# Patient Record
Sex: FEMALE | Race: BLACK OR AFRICAN AMERICAN | NOT HISPANIC OR LATINO | Employment: FULL TIME | URBAN - METROPOLITAN AREA
[De-identification: names, ages, dates, MRNs, and addresses within clinical notes are randomized per-mention and may not be internally consistent; named-entity substitution may affect disease eponyms.]

---

## 2021-11-08 ENCOUNTER — APPOINTMENT (OUTPATIENT)
Dept: MRI IMAGING | Facility: CLINIC | Age: 56
End: 2021-11-08
Attending: EMERGENCY MEDICINE
Payer: COMMERCIAL

## 2021-11-08 ENCOUNTER — HOSPITAL ENCOUNTER (EMERGENCY)
Facility: CLINIC | Age: 56
Discharge: HOME | End: 2021-11-08
Attending: EMERGENCY MEDICINE | Admitting: EMERGENCY MEDICINE
Payer: COMMERCIAL

## 2021-11-08 VITALS
SYSTOLIC BLOOD PRESSURE: 143 MMHG | TEMPERATURE: 97.8 F | RESPIRATION RATE: 18 BRPM | OXYGEN SATURATION: 100 % | HEART RATE: 58 BPM | WEIGHT: 149.2 LBS | DIASTOLIC BLOOD PRESSURE: 62 MMHG

## 2021-11-08 DIAGNOSIS — R42 VERTIGO: ICD-10-CM

## 2021-11-08 LAB
ALBUMIN SERPL-MCNC: 3.6 G/DL (ref 3.4–5)
ALBUMIN UR-MCNC: NEGATIVE MG/DL
ALP SERPL-CCNC: 96 U/L (ref 40–150)
ALT SERPL W P-5'-P-CCNC: 24 U/L (ref 0–50)
AMORPH CRY #/AREA URNS HPF: ABNORMAL /HPF
ANION GAP SERPL CALCULATED.3IONS-SCNC: 5 MMOL/L (ref 3–14)
APPEARANCE UR: ABNORMAL
AST SERPL W P-5'-P-CCNC: 13 U/L (ref 0–45)
ATRIAL RATE - MUSE: 59 BPM
BASOPHILS # BLD AUTO: 0 10E3/UL (ref 0–0.2)
BASOPHILS NFR BLD AUTO: 1 %
BILIRUB SERPL-MCNC: 0.3 MG/DL (ref 0.2–1.3)
BILIRUB UR QL STRIP: NEGATIVE
BUN SERPL-MCNC: 14 MG/DL (ref 7–30)
CALCIUM SERPL-MCNC: 8.9 MG/DL (ref 8.5–10.1)
CHLORIDE BLD-SCNC: 106 MMOL/L (ref 94–109)
CO2 SERPL-SCNC: 27 MMOL/L (ref 20–32)
COLOR UR AUTO: ABNORMAL
CREAT SERPL-MCNC: 0.48 MG/DL (ref 0.52–1.04)
DIASTOLIC BLOOD PRESSURE - MUSE: NORMAL MMHG
EOSINOPHIL # BLD AUTO: 0 10E3/UL (ref 0–0.7)
EOSINOPHIL NFR BLD AUTO: 0 %
ERYTHROCYTE [DISTWIDTH] IN BLOOD BY AUTOMATED COUNT: 13.4 % (ref 10–15)
GFR SERPL CREATININE-BSD FRML MDRD: >90 ML/MIN/1.73M2
GLUCOSE BLD-MCNC: 170 MG/DL (ref 70–99)
GLUCOSE UR STRIP-MCNC: NEGATIVE MG/DL
HCT VFR BLD AUTO: 38.5 % (ref 35–47)
HGB BLD-MCNC: 12.7 G/DL (ref 11.7–15.7)
HGB UR QL STRIP: NEGATIVE
HOLD SPECIMEN: NORMAL
IMM GRANULOCYTES # BLD: 0 10E3/UL
IMM GRANULOCYTES NFR BLD: 0 %
INTERPRETATION ECG - MUSE: NORMAL
KETONES UR STRIP-MCNC: NEGATIVE MG/DL
LEUKOCYTE ESTERASE UR QL STRIP: NEGATIVE
LYMPHOCYTES # BLD AUTO: 1 10E3/UL (ref 0.8–5.3)
LYMPHOCYTES NFR BLD AUTO: 13 %
MAGNESIUM SERPL-MCNC: 2 MG/DL (ref 1.6–2.3)
MCH RBC QN AUTO: 28.2 PG (ref 26.5–33)
MCHC RBC AUTO-ENTMCNC: 33 G/DL (ref 31.5–36.5)
MCV RBC AUTO: 86 FL (ref 78–100)
MONOCYTES # BLD AUTO: 0.3 10E3/UL (ref 0–1.3)
MONOCYTES NFR BLD AUTO: 4 %
MUCOUS THREADS #/AREA URNS LPF: PRESENT /LPF
NEUTROPHILS # BLD AUTO: 6.2 10E3/UL (ref 1.6–8.3)
NEUTROPHILS NFR BLD AUTO: 82 %
NITRATE UR QL: NEGATIVE
NRBC # BLD AUTO: 0 10E3/UL
NRBC BLD AUTO-RTO: 0 /100
P AXIS - MUSE: 37 DEGREES
PH UR STRIP: 7.5 [PH] (ref 5–7)
PLATELET # BLD AUTO: 265 10E3/UL (ref 150–450)
POTASSIUM BLD-SCNC: 4.2 MMOL/L (ref 3.4–5.3)
PR INTERVAL - MUSE: 170 MS
PROT SERPL-MCNC: 7.8 G/DL (ref 6.8–8.8)
QRS DURATION - MUSE: 86 MS
QT - MUSE: 436 MS
QTC - MUSE: 431 MS
R AXIS - MUSE: 45 DEGREES
RBC # BLD AUTO: 4.5 10E6/UL (ref 3.8–5.2)
RBC URINE: 3 /HPF
SODIUM SERPL-SCNC: 138 MMOL/L (ref 133–144)
SP GR UR STRIP: 1.01 (ref 1–1.03)
SYSTOLIC BLOOD PRESSURE - MUSE: NORMAL MMHG
T AXIS - MUSE: 50 DEGREES
TROPONIN I SERPL-MCNC: <0.015 UG/L (ref 0–0.04)
TSH SERPL DL<=0.005 MIU/L-ACNC: 0.59 MU/L (ref 0.4–4)
UROBILINOGEN UR STRIP-MCNC: NORMAL MG/DL
VENTRICULAR RATE- MUSE: 59 BPM
WBC # BLD AUTO: 7.5 10E3/UL (ref 4–11)
WBC URINE: 1 /HPF

## 2021-11-08 PROCEDURE — 93010 ELECTROCARDIOGRAM REPORT: CPT | Performed by: EMERGENCY MEDICINE

## 2021-11-08 PROCEDURE — A9585 GADOBUTROL INJECTION: HCPCS | Performed by: EMERGENCY MEDICINE

## 2021-11-08 PROCEDURE — 81001 URINALYSIS AUTO W/SCOPE: CPT | Performed by: EMERGENCY MEDICINE

## 2021-11-08 PROCEDURE — C9803 HOPD COVID-19 SPEC COLLECT: HCPCS | Performed by: EMERGENCY MEDICINE

## 2021-11-08 PROCEDURE — 250N000009 HC RX 250: Performed by: EMERGENCY MEDICINE

## 2021-11-08 PROCEDURE — 250N000011 HC RX IP 250 OP 636: Performed by: EMERGENCY MEDICINE

## 2021-11-08 PROCEDURE — 96374 THER/PROPH/DIAG INJ IV PUSH: CPT | Mod: 59 | Performed by: EMERGENCY MEDICINE

## 2021-11-08 PROCEDURE — 70544 MR ANGIOGRAPHY HEAD W/O DYE: CPT | Mod: XS

## 2021-11-08 PROCEDURE — 84443 ASSAY THYROID STIM HORMONE: CPT | Performed by: EMERGENCY MEDICINE

## 2021-11-08 PROCEDURE — 250N000013 HC RX MED GY IP 250 OP 250 PS 637: Performed by: EMERGENCY MEDICINE

## 2021-11-08 PROCEDURE — 70549 MR ANGIOGRAPH NECK W/O&W/DYE: CPT

## 2021-11-08 PROCEDURE — 85004 AUTOMATED DIFF WBC COUNT: CPT | Performed by: EMERGENCY MEDICINE

## 2021-11-08 PROCEDURE — 255N000002 HC RX 255 OP 636: Performed by: EMERGENCY MEDICINE

## 2021-11-08 PROCEDURE — 99285 EMERGENCY DEPT VISIT HI MDM: CPT | Mod: 25 | Performed by: EMERGENCY MEDICINE

## 2021-11-08 PROCEDURE — 258N000003 HC RX IP 258 OP 636: Performed by: EMERGENCY MEDICINE

## 2021-11-08 PROCEDURE — 80053 COMPREHEN METABOLIC PANEL: CPT | Performed by: EMERGENCY MEDICINE

## 2021-11-08 PROCEDURE — 93005 ELECTROCARDIOGRAM TRACING: CPT | Performed by: EMERGENCY MEDICINE

## 2021-11-08 PROCEDURE — 83735 ASSAY OF MAGNESIUM: CPT | Performed by: EMERGENCY MEDICINE

## 2021-11-08 PROCEDURE — 36415 COLL VENOUS BLD VENIPUNCTURE: CPT | Performed by: EMERGENCY MEDICINE

## 2021-11-08 PROCEDURE — 70553 MRI BRAIN STEM W/O & W/DYE: CPT

## 2021-11-08 PROCEDURE — 84484 ASSAY OF TROPONIN QUANT: CPT | Performed by: EMERGENCY MEDICINE

## 2021-11-08 PROCEDURE — 96361 HYDRATE IV INFUSION ADD-ON: CPT | Performed by: EMERGENCY MEDICINE

## 2021-11-08 RX ORDER — ONDANSETRON 4 MG/1
4 TABLET, ORALLY DISINTEGRATING ORAL EVERY 8 HOURS PRN
Qty: 9 TABLET | Refills: 0 | Status: SHIPPED | OUTPATIENT
Start: 2021-11-08 | End: 2021-11-11

## 2021-11-08 RX ORDER — MECLIZINE HYDROCHLORIDE 25 MG/1
25 TABLET ORAL ONCE
Status: COMPLETED | OUTPATIENT
Start: 2021-11-08 | End: 2021-11-08

## 2021-11-08 RX ORDER — DIAZEPAM 2 MG
2 TABLET ORAL ONCE
Status: COMPLETED | OUTPATIENT
Start: 2021-11-08 | End: 2021-11-08

## 2021-11-08 RX ORDER — MECLIZINE HCL 12.5 MG 12.5 MG/1
25 TABLET ORAL 3 TIMES DAILY PRN
Qty: 30 TABLET | Refills: 0 | Status: SHIPPED | OUTPATIENT
Start: 2021-11-08 | End: 2022-10-08

## 2021-11-08 RX ORDER — ONDANSETRON 2 MG/ML
4 INJECTION INTRAMUSCULAR; INTRAVENOUS ONCE
Status: COMPLETED | OUTPATIENT
Start: 2021-11-08 | End: 2021-11-08

## 2021-11-08 RX ORDER — SCOLOPAMINE TRANSDERMAL SYSTEM 1 MG/1
1 PATCH, EXTENDED RELEASE TRANSDERMAL
Status: DISCONTINUED | OUTPATIENT
Start: 2021-11-08 | End: 2021-11-08 | Stop reason: HOSPADM

## 2021-11-08 RX ORDER — SODIUM CHLORIDE 9 MG/ML
INJECTION, SOLUTION INTRAVENOUS CONTINUOUS
Status: DISCONTINUED | OUTPATIENT
Start: 2021-11-08 | End: 2021-11-08 | Stop reason: HOSPADM

## 2021-11-08 RX ORDER — GADOBUTROL 604.72 MG/ML
7.5 INJECTION INTRAVENOUS ONCE
Status: COMPLETED | OUTPATIENT
Start: 2021-11-08 | End: 2021-11-08

## 2021-11-08 RX ORDER — SCOLOPAMINE TRANSDERMAL SYSTEM 1 MG/1
1 PATCH, EXTENDED RELEASE TRANSDERMAL
Qty: 3 PATCH | Refills: 0 | Status: SHIPPED | OUTPATIENT
Start: 2021-11-08 | End: 2022-10-08

## 2021-11-08 RX ADMIN — ONDANSETRON 4 MG: 2 INJECTION INTRAMUSCULAR; INTRAVENOUS at 11:32

## 2021-11-08 RX ADMIN — SCOPALAMINE 1 PATCH: 1 PATCH, EXTENDED RELEASE TRANSDERMAL at 11:33

## 2021-11-08 RX ADMIN — DIAZEPAM 2 MG: 2 TABLET ORAL at 14:12

## 2021-11-08 RX ADMIN — SODIUM CHLORIDE 1000 ML: 9 INJECTION, SOLUTION INTRAVENOUS at 11:11

## 2021-11-08 RX ADMIN — MECLIZINE HYDROCHLORIDE 25 MG: 25 TABLET ORAL at 11:33

## 2021-11-08 RX ADMIN — GADOBUTROL 7 ML: 604.72 INJECTION INTRAVENOUS at 14:45

## 2021-11-08 NOTE — DISCHARGE INSTRUCTIONS
Please make an appointment to follow up with Your Primary Care Provider or Primary Care Center (phone: [phone]) as soon as possible if not improving. Can follow up with physical therapy for the vertigo if not improving.     Can take zofran for nausea. Can take meclizine as needed for dizziness. Can keep the scopolamine patch in place for 72 hours and then remove and replace as needed.     Return to the ED if you develop worsening dizziness, headache, vision changes, numbness, tingling, weakness, confusion, fever, chest pain, shortness of breath, loss of consciousness, or any new or worsening concerns.

## 2021-11-08 NOTE — LETTER
November 8, 2021      To Whom It May Concern:      Elena Malik was seen in our Emergency Department today, 11/08/21.  I expect her condition to improve over the next 3 days.  She may return to work/school when improved.    Sincerely,        Lilli George MD

## 2021-11-08 NOTE — ED PROVIDER NOTES
"    [facility] EMERGENCY DEPARTMENT ([facility])       11/08/21  History     Chief Complaint   Patient presents with     Dizziness     dizziness started at 2 am along with vomiting     The history is provided by the patient and medical records.     Elena Malik is a 55 year old female with a past medical history significant for type 2 diabetes mellitus and vertigo who presents to the Emergency Department for evaluation of dizziness.  Patient is currently accompanied here to the ED by her daughter who aids in providing pertinent information.  Patient's daughter reports that the patient's symptoms started at approximately 2 AM this morning.  She reports the patient initially woke up dizzy which was followed by nausea and vomiting.  She reports the patient has also had 2 episodes of diarrhea this morning as well.  Patient reports she does have 1 prior episode of dizziness like this in which she was prescribed meclizine, but reports she did not take this medication.  Patient currently endorses ongoing dizziness here in the ED while lying down, and states that any movement makes this dizziness significantly worse.  Patient reports this is a \"room spinning\" dizziness, but also has reported brief episodes of lightheadedness.  She reports she has not lost consciousness or had any falls.  Patient reports that this particular episode is worse than her last as this episode of dizziness is more constant, compared to her previous episode which only lasted for \"minutes\".  Patient currently endorses a headache as well.  No associated blurred vision or other visual disturbances.  No numbness, tingling, or weakness anywhere.  No fever, cough, rhinorrhea, nasal congestion, or other URI symptoms.  No chest pain or shortness of breath.  No abdominal pain.  No dysuria, hematuria, or other urinary changes.  Patient denies any hematemesis, bright blood per rectum, or melena.  She denies known history of heart problems, hypertension, " or hyperlipidemia.  No notable swelling in her legs.  No COVID-19 exposure to her knowledge.  She is vaccinated for COVID-19.      Past Medical History  Past Medical History:   Diagnosis Date     Diabetes (H)      History reviewed. No pertinent surgical history.  meclizine (ANTIVERT) 12.5 MG tablet  scopolamine (TRANSDERM) 1 MG/3DAYS 72 hr patch      No Known Allergies  Family History  No family history on file.  Social History   Social History     Tobacco Use     Smoking status: None     Smokeless tobacco: None   Substance Use Topics     Alcohol use: None     Drug use: None      Past medical history, past surgical history, medications, allergies, family history, and social history were reviewed with the patient. No additional pertinent items.     I have reviewed the Medications, Allergies, Past Medical and Surgical History, and Social History in the Epic system.    Review of Systems  A complete review of systems was performed with pertinent positives and negatives noted in the HPI, and all other systems negative.    Physical Exam   BP: 119/76  Pulse: 57  Temp: 97.5  F (36.4  C)  Resp: 16  Weight: 67.7 kg (149 lb 3.2 oz)  SpO2: 99 %      Physical Exam  Vitals and nursing note reviewed.   Constitutional:       General: She is not in acute distress.     Appearance: She is well-developed.   HENT:      Head: Normocephalic and atraumatic.      Right Ear: Tympanic membrane normal.      Left Ear: Tympanic membrane normal.      Mouth/Throat:      Mouth: Mucous membranes are moist.      Pharynx: No oropharyngeal exudate.   Eyes:      General: No visual field deficit.     Extraocular Movements: Extraocular movements intact.      Conjunctiva/sclera: Conjunctivae normal.      Pupils: Pupils are equal, round, and reactive to light.      Comments: No obvious nystagmus   Neck:      Vascular: No carotid bruit.   Cardiovascular:      Rate and Rhythm: Normal rate and regular rhythm.      Pulses: Normal pulses.      Heart sounds:  Normal heart sounds. No murmur heard.      Pulmonary:      Effort: Pulmonary effort is normal. No respiratory distress.      Breath sounds: Normal breath sounds. No wheezing or rales.   Abdominal:      General: Bowel sounds are normal. There is no distension.      Palpations: Abdomen is soft. There is no mass.      Tenderness: There is no abdominal tenderness. There is no guarding or rebound.   Musculoskeletal:         General: No swelling or tenderness. Normal range of motion.      Cervical back: Normal range of motion and neck supple. No rigidity.   Skin:     General: Skin is warm and dry.      Capillary Refill: Capillary refill takes less than 2 seconds.      Findings: No rash.   Neurological:      General: No focal deficit present.      Mental Status: She is alert and oriented to person, place, and time.      GCS: GCS eye subscore is 4. GCS verbal subscore is 5. GCS motor subscore is 6.      Cranial Nerves: Cranial nerves are intact. No cranial nerve deficit, dysarthria or facial asymmetry.      Sensory: Sensation is intact. No sensory deficit.      Motor: Motor function is intact. No weakness, abnormal muscle tone or pronator drift.      Coordination: Coordination normal. Finger-Nose-Finger Test and Heel to Shin Test normal.      Comments: 5 out of 5 strength in all 4 extremities bilaterally.  Sensation intact light touch in all 4 extremities bilaterally.  Speech is normal per daughter.  Finger-to-nose and heel-to-shin intact bilaterally.  Visual fields intact.   Psychiatric:         Mood and Affect: Mood normal.         ED Course     At 10:52 AM the patient was seen and examined by Lilli George MD in Room ED07.     ED Course as of 12/21/21 1821   Mon Nov 08, 2021   5027 Patient ambulated without ataxia or dizziness to and from the bathroom     Procedures            EKG Interpretation:      Interpreted by Lilli George MD  Time reviewed: 10:10 am  Symptoms at time of EKG: dizziness   Rhythm: sinus  bradycardia  Rate: 50-60 (59 bpm)  Axis: Normal  Ectopy: none  Conduction: normal  ST Segments/ T Waves: No ST-T wave changes  Q Waves: none  Comparison to prior: No old EKG available    Clinical Impression: no evidence of acute ischemia.  Sinus bradycardia.  Normal intervals.  No signs of WW, Brugada syndrome, or LVH.                The medical record was reviewed and interpreted.  Current labs reviewed and interpreted.  Current images reviewed and interpreted: as below.  EKG reviewed and interpreted: as above.  Managed outpatient prescription medications.   utilized.         Labs Ordered and Resulted from Time of ED Arrival to Time of ED Departure   COMPREHENSIVE METABOLIC PANEL - Abnormal       Result Value    Sodium 138      Potassium 4.2      Chloride 106      Carbon Dioxide (CO2) 27      Anion Gap 5      Urea Nitrogen 14      Creatinine 0.48 (*)     Calcium 8.9      Glucose 170 (*)     Alkaline Phosphatase 96      AST 13      ALT 24      Protein Total 7.8      Albumin 3.6      Bilirubin Total 0.3      GFR Estimate >90     ROUTINE UA WITH MICROSCOPIC REFLEX TO CULTURE - Abnormal    Color Urine Light Yellow      Appearance Urine Slightly Cloudy (*)     Glucose Urine Negative      Bilirubin Urine Negative      Ketones Urine Negative      Specific Gravity Urine 1.013      Blood Urine Negative      pH Urine 7.5 (*)     Protein Albumin Urine Negative      Urobilinogen Urine Normal      Nitrite Urine Negative      Leukocyte Esterase Urine Negative      Mucus Urine Present (*)     Amorphous Crystals Urine Few (*)     RBC Urine 3 (*)     WBC Urine 1     TROPONIN I - Normal    Troponin I <0.015     TSH WITH FREE T4 REFLEX - Normal    TSH 0.59     MAGNESIUM - Normal    Magnesium 2.0     CBC WITH PLATELETS AND DIFFERENTIAL    WBC Count 7.5      RBC Count 4.50      Hemoglobin 12.7      Hematocrit 38.5      MCV 86      MCH 28.2      MCHC 33.0      RDW 13.4      Platelet Count 265      % Neutrophils 82      %  Lymphocytes 13      % Monocytes 4      % Eosinophils 0      % Basophils 1      % Immature Granulocytes 0      NRBCs per 100 WBC 0      Absolute Neutrophils 6.2      Absolute Lymphocytes 1.0      Absolute Monocytes 0.3      Absolute Eosinophils 0.0      Absolute Basophils 0.0      Absolute Immature Granulocytes 0.0      Absolute NRBCs 0.0       MR Brain w/o & w Contrast   Final Result   IMPRESSION:     1. No evidence of acute infarct, mass, hemorrhage, or herniation.   2. A few nonspecific white matter changes likely due to chronic   microvascular ischemic disease. These are not unexpected in a patient   of this age.          ZACH EDWARDS MD            SYSTEM ID:  [id number]      MRA Neck (Carotids) wo & w Contrast   Final Result   IMPRESSION:  Normal MR angiogram of the neck.          ZACH EDWARDS MD            SYSTEM ID:  [facility]      MRA Brain ([place]) wo Contrast   Final Result   IMPRESSION:  Normal MR angiogram of the head.           ZACH EDWARDS MD            SYSTEM ID:  [id number]          Medications   0.9% sodium chloride BOLUS (0 mLs Intravenous Stopped 11/8/21 1602)   ondansetron (ZOFRAN) injection 4 mg (4 mg Intravenous Given 11/8/21 1132)   meclizine (ANTIVERT) tablet 25 mg (25 mg Oral Given 11/8/21 1133)   diazepam (VALIUM) tablet 2 mg (2 mg Oral Given 11/8/21 1412)   gadobutrol (GADAVIST) injection 7.5 mL (7 mLs Intravenous Given 11/8/21 1445)             Assessments & Plan (with Medical Decision Making)   Patient presents with dizziness.  On exam she is in no acute distress.  She does not have any obvious sign of cerebellar deficit on exam.  No focal neurologic deficits.  Differential diagnosis includes but is not limited to peripheral vertigo, central vertigo, CVA, cardiac etiology such as arrhythmia or atypical acute coronary syndrome however less likely with her overall presentation, anemia, electrolyte disturbance, UTI.  She has had similar dizziness in the past that was diagnosed as  peripheral vertigo.  However she states this is more constant and she also has a headache.  Also considered intracranial hemorrhage or intracranial mass or atypical migraine.  She has not had any recent illness and has no tinnitus or hearing loss to suggest Ménière's disease.  TMs are unremarkable here.  IV access was established and the patient was given IV fluids as well as IV Zofran.  She was also given meclizine.    Laboratory studies were obtained.  EKG was also obtained which did not reveal any evidence of acute ischemia or arrhythmia.  No sign of WPW, LVH, or Brugada syndrome.  She did have some mild bradycardia at 59.  However she is having the dizziness just sitting in the bed and describes it as room spinning again making cardiac etiology less likely.  Troponin is negative.  Overall I feel that acute coronary syndrome is unlikely with this presentation and reassuring EKG and troponin.  CBC reveals white blood count 7.5 with a hemoglobin of 12.7.  CMP is largely unremarkable.  Urinalysis does not reveal any evidence of UTI.  Magnesium is within normal limits.  TSH is within normal limits.  She remained vitally stable while here in the emergency department.  She continued to have dizziness and thus we did decide to obtain MRI of the brain with and without contrast, and MRA of the brain and neck.  These were unremarkable as above.  No sign of CVA or vascular occlusion or dissection.  No sign of intracranial hemorrhage.  Did give the patient scopolamine patch as well as a dose of 2 mg of oral Valium.  On reevaluation she was feeling much improved and able to ambulate without ataxia or dizziness to and from the bathroom.  Do feel at this point that this is likely a peripheral vertigo.  She is given a prescription for Zofran, Antivert, and scopolamine.  She was advised to follow-up with her primary care provider who can refer to physical therapy for vestibular therapy if not improving.  She was given indications  for return.  She voiced understanding was comfortable with this plan.  She was discharged home in stable condition.    I have reviewed the nursing notes.    I have reviewed the findings, diagnosis, plan and need for follow up with the patient.    Discharge Medication List as of 11/8/2021  4:03 PM      START taking these medications    Details   meclizine (ANTIVERT) 12.5 MG tablet Take 2 tablets (25 mg) by mouth 3 times daily as needed for dizziness, Disp-30 tablet, R-0, Local Print      ondansetron (ZOFRAN ODT) 4 MG ODT tab Take 1 tablet (4 mg) by mouth every 8 hours as needed for nausea or vomiting, Disp-9 tablet, R-0, Local Print      scopolamine (TRANSDERM) 1 MG/3DAYS 72 hr patch Place 1 patch onto the skin every 72 hours, Disp-3 patch, R-0, Local Print             Final diagnoses:   Vertigo       I, Manohar Tripathi am serving as a trained medical scribe to document services personally performed by Lilli George MD, based on the provider's statements to me.      I, Lilli George MD, was physically present and have reviewed and verified the accuracy of this note documented by Manohar Tripathi.     Lilli George MD  11/8/2021   [facility] EMERGENCY DEPARTMENT     Lilli George MD  12/21/21 3622

## 2022-01-03 ENCOUNTER — THERAPY VISIT (OUTPATIENT)
Dept: PHYSICAL THERAPY | Facility: CLINIC | Age: 57
End: 2022-01-03
Attending: EMERGENCY MEDICINE
Payer: COMMERCIAL

## 2022-01-03 DIAGNOSIS — R42 VERTIGO: ICD-10-CM

## 2022-01-03 PROCEDURE — 97161 PT EVAL LOW COMPLEX 20 MIN: CPT | Mod: GP | Performed by: PHYSICAL THERAPIST

## 2022-01-03 NOTE — DISCHARGE INSTRUCTIONS
[email or web address]    Video for treatment of left side using Epley Maneuver    Eun Chan PT, DPT  Physical Therapist  [facility] [address]  [address] [place] [address]  [email or web address]  Appointments: [phone]

## 2022-01-03 NOTE — PROGRESS NOTES
01/03/22 0900   Quick Adds   Quick Adds Vestibular Eval   Type of Visit Initial OP PT Evaluation       Present Yes   Language Other  ([place])   General Information   Start of Care Date 01/03/22   Referring Physician Lilli George MD   Orders Evaluate and Treat as Indicated   Order Date 11/08/21   Medical Diagnosis Vertigp   Onset of illness/injury or Date of Surgery 11/08/21   Precautions/Limitations no known precautions/limitations   Surgical/Medical history reviewed Yes   Pertinent history of current problem (include personal factors and/or comorbidities that impact the POC) Patient went to ED on 11/8/21 with vertig and emesis. Patient has a history of vertigo (sounds like BPPV), but this episode lasted much longer in duration and was more intense. PMH: DM and vertigo. Patient reports ongoing symptoms (vertigo) that occurs when she lays down in bed. Duration reported as 3-4 hours, frequency is not every day. Patient takes Meclizine when she gets dizzy and it helps reduce symptoms. Patient denies headaches, sound/light sensitivity. Patient denies imbalance   Pertinent Visual History  No deficits   Prior level of function comment Independent   Home/Community Accessibility Comments Lives alone-son visiting from college until January 7   Assistive Devices Comments None   Patient/Family Goals Statement to not have dizziness   Fall Risk Screen   Fall screen completed by PT   Have you fallen 2 or more times in the past year? No   Have you fallen and had an injury in the past year? No   Is patient a fall risk? No   Pain   Patient currently in pain No   Cognitive Status Examination   Orientation orientation to person, place and time   Level of Consciousness alert   Follows Commands and Answers Questions 100% of the time;able to follow multistep instructions   Personal Safety and Judgment intact   Memory intact   Range of Motion (ROM)   ROM Quick Adds no deficits were identified   Bed Mobility    Bed Mobility Comments Independent   Transfer Skills   Transfer Comments Independent   Gait   Gait Comments Patient ambulates without a device independently-no instability demonstrated   Gait Special Tests   Gait Special Tests FUNCTIONAL GAIT ASSESSMENT   Gait Special Tests Functional Gait Assessment Score out of 30   Score out of 30 29   Balance Special Tests   Balance Special Tests Modified CTSIB Conditions   Balance Special Tests Modified CTSIB Conditions   Condition 1, seconds 30 Seconds   Condition 2, seconds 30 Seconds   Condition 4, seconds 30 Seconds   Condition 5, seconds 30 Seconds   Cervicogenic Screen   Neck ROM WNL   Oculomotor Exam   Smooth Pursuit Normal   Saccades Normal   VOR Abnormal   VOR Comments horizontal retinal slip   Rapid Head Thrust Other   Rapid Head Thrust Comments inconsistent- can get on both sides   Convergence Testing Normal   Infrared Goggle Exam or Frenzel Lense Exam   Vestibular Suppressant in Last 24 Hours? Yes   Exam completed with Infrared Goggles   Spontaneous Nystagmus Negative   Gaze Evoked Nystagmus Negative   Head Shake Horizontal Nystagmus Negative   Positional Testing comments Attempted 3-4 times.  Had patient perform positions at home and cause dizziness, but still unable to elicit symptoms or vertigo   Bryan-Hallpike (right) Negative   Bryan-Hallpike (Left) Negative   HSCC Supine Roll Test (Right) Negative   HSCC Supine Roll Test (Left) Negative   Dynamic Visual Acuity (DVA)   DVA Comments Did not assess due to patient's primary language as non-English   Clinical Impression   Criteria for Skilled Therapeutic Interventions Met evaluation only;no problems identified which require skilled intervention   Clinical Impression Comments Patient is a 56-year-old female who presents outpatient physical therapy with reports of vertigo onset November 8, 2021 (patient went to ED with vertigo and emesis).  Patient reports no additional vertigo episodes, but takes meclizine when she  feels like dizziness is going to start (occurs primarily with bed mobility) and reduces symptoms completely.  On exam patient demonstrates negative findings on all peripheral vestibular clinical examination. educated patient and son on findings and possible BPPV involvement, however negative exam findings today.  Issued home Epley maneuver patient and son can perform at home if vertigo returns outside of clinic.  Recommend additional PT follow-up if needed in future   Total Evaluation Time   PT Eval, Low Complexity Minutes (24684) 45     Eun Chan PT, DPT  Physical Therapist  [facility] [address]  [address] [place] [address]  [email or web address]  Appointments: [phone]

## 2022-07-03 ENCOUNTER — HOSPITAL ENCOUNTER (EMERGENCY)
Facility: CLINIC | Age: 57
Discharge: HOME | End: 2022-07-03
Attending: EMERGENCY MEDICINE | Admitting: EMERGENCY MEDICINE
Payer: OTHER MISCELLANEOUS

## 2022-07-03 VITALS
SYSTOLIC BLOOD PRESSURE: 134 MMHG | OXYGEN SATURATION: 94 % | RESPIRATION RATE: 18 BRPM | TEMPERATURE: 98.1 F | DIASTOLIC BLOOD PRESSURE: 64 MMHG | HEART RATE: 88 BPM

## 2022-07-03 DIAGNOSIS — S00.03XA CONTUSION OF SCALP, INITIAL ENCOUNTER: ICD-10-CM

## 2022-07-03 PROCEDURE — 99283 EMERGENCY DEPT VISIT LOW MDM: CPT | Performed by: EMERGENCY MEDICINE

## 2022-07-03 PROCEDURE — 99282 EMERGENCY DEPT VISIT SF MDM: CPT | Performed by: EMERGENCY MEDICINE

## 2022-07-03 PROCEDURE — 250N000013 HC RX MED GY IP 250 OP 250 PS 637: Performed by: EMERGENCY MEDICINE

## 2022-07-03 RX ORDER — IBUPROFEN 600 MG/1
600 TABLET, FILM COATED ORAL ONCE
Status: COMPLETED | OUTPATIENT
Start: 2022-07-03 | End: 2022-07-03

## 2022-07-03 RX ADMIN — IBUPROFEN 600 MG: 600 TABLET ORAL at 10:11

## 2022-07-03 ASSESSMENT — ENCOUNTER SYMPTOMS
EYE REDNESS: 0
ABDOMINAL PAIN: 0
VOMITING: 0
HEADACHES: 0
NAUSEA: 0
DIFFICULTY URINATING: 0
COUGH: 0
NUMBNESS: 0
DYSURIA: 0
FEVER: 0
NECK PAIN: 0
BACK PAIN: 0
SHORTNESS OF BREATH: 0
SLEEP DISTURBANCE: 0
SORE THROAT: 0
WEAKNESS: 0

## 2022-07-03 NOTE — DISCHARGE INSTRUCTIONS
Take ibuprofen and/or acetaminophen as needed for discomfort.  Apply ice to the sore area on your head for 20 minutes at a time, 3-4 times per day.  Place a towel between the ice pack and your skin.  Activity as tolerated.  Try to avoid further trauma to your head.    Follow-up with employee health as needed.    Return to the emergency department if vomiting, weakness, trouble walking, trouble talking, or other concerns.

## 2022-07-03 NOTE — ED NOTES
Pt evaluated to discharge to her own custody today at 10:15am. discharge instructions explained to her, she verbalized understanding and agreed to adhere. Pt appeared in no sign of distress.
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
## 2022-07-03 NOTE — ED PROVIDER NOTES
ED Provider Note  [facility]      History     Chief Complaint   Patient presents with     Head Injury     HPI  Elena Malik is a 56 year old female who presents to the emergency department for evaluation of head injury.  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  She states that she was struck in the right side of the head when she was bending over by one of the carts that she believes was plastic.  Patient states that she fell to the ground.  She denies any loss of consciousness.  She has pain where she was struck in the head but no generalized headache.  She denies any photophobia.  No nausea or vomiting.  No neck pain.  No weakness or numbness.  No difficulty with her balance.  No difficulty with her gait.  No difficulty talking.  Patient denies any other injury.  No extremity injury.  No chest pain or dyspnea.  No abdominal pain.  She denies any anticoagulant use.    Past Medical History  Past Medical History:   Diagnosis Date     Diabetes (H)      No past surgical history on file.  meclizine (ANTIVERT) 12.5 MG tablet  scopolamine (TRANSDERM) 1 MG/3DAYS 72 hr patch      No Known Allergies  Family History  No family history on file.  Social History       Past medical history, past surgical history, medications, allergies, family history, and social history were reviewed with the patient. No additional pertinent items.       Review of Systems   Constitutional: Negative for fever.   HENT: Negative for sore throat.    Eyes: Negative for redness.   Respiratory: Negative for cough and shortness of breath.    Cardiovascular: Negative for chest pain.   Gastrointestinal: Negative for abdominal pain, nausea and vomiting.   Genitourinary: Negative for difficulty urinating and dysuria.   Musculoskeletal: Negative for back pain and neck pain.   Skin: Negative for rash.   Neurological: Negative for weakness,  numbness and headaches.   Psychiatric/Behavioral: Negative for sleep disturbance.   All other systems reviewed and are negative.    A complete review of systems was performed with pertinent positives and negatives noted in the HPI, and all other systems negative.    Physical Exam   BP: 134/64  Pulse: 88  Temp: 98.1  F (36.7  C)  Resp: 18  SpO2: 94 %  Physical Exam  Vitals and nursing note reviewed.   Constitutional:       General: She is not in acute distress.     Appearance: Normal appearance. She is not diaphoretic.   HENT:      Head: Normocephalic and atraumatic.        Mouth/Throat:      Mouth: Mucous membranes are moist.      Pharynx: No oropharyngeal exudate.   Eyes:      General: No scleral icterus.     Extraocular Movements: Extraocular movements intact.      Pupils: Pupils are equal, round, and reactive to light.   Cardiovascular:      Rate and Rhythm: Normal rate and regular rhythm.      Pulses: Normal pulses.      Heart sounds: Normal heart sounds.   Pulmonary:      Effort: No respiratory distress.      Breath sounds: Normal breath sounds.   Chest:      Chest wall: No tenderness.   Abdominal:      General: Bowel sounds are normal.      Palpations: Abdomen is soft.      Tenderness: There is no abdominal tenderness.   Musculoskeletal:         General: No tenderness. Normal range of motion.      Cervical back: Normal range of motion. No tenderness.      Thoracic back: No tenderness.      Lumbar back: No tenderness.   Skin:     General: Skin is warm and dry.      Findings: No rash.   Neurological:      General: No focal deficit present.      Mental Status: She is alert and oriented to person, place, and time.      GCS: GCS eye subscore is 4. GCS verbal subscore is 5. GCS motor subscore is 6.      Cranial Nerves: No cranial nerve deficit.      Motor: No weakness.      Coordination: Coordination normal.      Gait: Gait normal.         ED Course      Procedures       The medical record was reviewed and  interpreted.         CT not indicated by [place] head CT and [place] head CT rules.     No results found for any visits on 07/03/22.  Medications   ibuprofen (ADVIL/MOTRIN) tablet 600 mg (600 mg Oral Given 7/3/22 1011)        Assessments & Plan (with Medical Decision Making)   56 year old female to the emergency department from work where she was struck in the head by a plastic cart.  She was bending over that time and fell to the ground.  She sustained no other injury.  She has discomfort at the area of impact but no other diffuse headache.  No photophobia.  No nausea or vomiting.  She has a normal neurologic examination.  She is not on any anticoagulants.  CT scan is not indicated by head CT a decision rules.  Patient given ibuprofen for her discomfort.  She appears appropriate for discharge and outpatient follow-up as needed.  She may return to work without restrictions.    I have reviewed the nursing notes. I have reviewed the findings, diagnosis, plan and need for follow up with the patient.    Discharge Medication List as of 7/3/2022  9:55 AM          Final diagnoses:   Contusion of scalp, initial encounter     Chart documentation was completed with Dragon voice-recognition software. Even though reviewed, this chart may still contain some grammatical, spelling, and word errors.     --  Behzad Jaime Md  [facility] EMERGENCY DEPARTMENT  7/3/2022     Behzad Jaime MD  07/03/22 3326

## 2022-07-08 ENCOUNTER — HOSPITAL ENCOUNTER (EMERGENCY)
Facility: CLINIC | Age: 57
End: 2022-07-08
Payer: COMMERCIAL

## 2022-08-09 ENCOUNTER — THERAPY VISIT (OUTPATIENT)
Dept: PHYSICAL THERAPY | Facility: CLINIC | Age: 57
End: 2022-08-09
Payer: OTHER MISCELLANEOUS

## 2022-08-09 DIAGNOSIS — M25.561 RIGHT KNEE PAIN: Primary | ICD-10-CM

## 2022-08-09 PROCEDURE — 97161 PT EVAL LOW COMPLEX 20 MIN: CPT | Mod: GP

## 2022-08-09 PROCEDURE — 97110 THERAPEUTIC EXERCISES: CPT | Mod: GP

## 2022-08-09 ASSESSMENT — ACTIVITIES OF DAILY LIVING (ADL)
STAND: ACTIVITY IS MINIMALLY DIFFICULT
RAW_SCORE: 56
LIMPING: I DO NOT HAVE THE SYMPTOM
GO UP STAIRS: ACTIVITY IS FAIRLY DIFFICULT
GO DOWN STAIRS: ACTIVITY IS SOMEWHAT DIFFICULT
WEAKNESS: I DO NOT HAVE THE SYMPTOM
WALK: ACTIVITY IS MINIMALLY DIFFICULT
AS_A_RESULT_OF_YOUR_KNEE_INJURY,_HOW_WOULD_YOU_RATE_YOUR_CURRENT_LEVEL_OF_DAILY_ACTIVITY?: NEARLY NORMAL
RISE FROM A CHAIR: ACTIVITY IS NOT DIFFICULT
SQUAT: ACTIVITY IS SOMEWHAT DIFFICULT
SIT WITH YOUR KNEE BENT: ACTIVITY IS MINIMALLY DIFFICULT
KNEEL ON THE FRONT OF YOUR KNEE: ACTIVITY IS SOMEWHAT DIFFICULT
SWELLING: I DO NOT HAVE THE SYMPTOM
GIVING WAY, BUCKLING OR SHIFTING OF KNEE: I HAVE THE SYMPTOM BUT IT DOES NOT AFFECT MY ACTIVITY
HOW_WOULD_YOU_RATE_THE_OVERALL_FUNCTION_OF_YOUR_KNEE_DURING_YOUR_USUAL_DAILY_ACTIVITIES?: NEARLY NORMAL
KNEE_ACTIVITY_OF_DAILY_LIVING_SUM: 56
STIFFNESS: I DO NOT HAVE THE SYMPTOM
HOW_WOULD_YOU_RATE_THE_CURRENT_FUNCTION_OF_YOUR_KNEE_DURING_YOUR_USUAL_DAILY_ACTIVITIES_ON_A_SCALE_FROM_0_TO_100_WITH_100_BEING_YOUR_LEVEL_OF_KNEE_FUNCTION_PRIOR_TO_YOUR_INJURY_AND_0_BEING_THE_INABILITY_TO_PERFORM_ANY_OF_YOUR_USUAL_DAILY_ACTIVITIES?: 50
KNEE_ACTIVITY_OF_DAILY_LIVING_SCORE: 80
PAIN: I HAVE THE SYMPTOM BUT IT DOES NOT AFFECT MY ACTIVITY

## 2022-08-09 NOTE — LETTER
[facility] [address] [id number] [phone]    August 10, 2022    Re: Elena Malik   :   1965  MRN:  [id number]   REFERRING PHYSICIAN:   Fallon KNIGHT [facility]    Date of Initial Evaluation:  22  Visits:  Rxs Used: 1  Reason for Referral:  Right knee pain    EVALUATION SUMMARY    Physical Therapy Initial Examination/Evaluation  2022    Key PT Findings:   1) Lacking terminal extension and end range flexion   2) Pain with SLR and LAQ with quadriceps activation  3) All knee ligament testing negative; quadriceps/patellar tendon involvement or bone bruise on lateral condyle    Elena Malik is a 56 year old female referred to physical therapy by Dr. Fallon Choudhary MD for right knee pain Precautions/Restrictions/MD instructions E&T (12 visits)    Therapist Impression: Elena Malik presents to therapy with right knee pain. Currently demonstrates impairments with flexion range of motion, lateral sided discomfort and pain. Due to these impairments, Elena Malik is unable to go up stairs without pain, bend knee, and kneel painfree.      Subjective:  Presenting Complaint Right knee pain   Mechanism of Injury  Fall on right side   DOI (onset)/ DOS 2022   Functional Limitations Bending knee to sit, going up and down stairs, sometimes pain with kneeling    Notable PMH See Epic chart    Prior treatment Elevation   good   Prior Imaging  x-ray- found old fracture       Pain/Presentation    Pain Level   Rest: 0/10  ; Activity: 4/10   Location   lateral portion of knee; knee cap    Frequency Intermittent   Described as aching and dull    Alleviated by  Rest and Elevation   Progression of Sx Unchanged   Time of Day  Activity related and Position related   Sleeping  No issues/uninterrupted       Social Factors/Lifestyle  Occupation and Duties     prolonged sitting, prolonged standing, prolonged walking, lifting/carrying, pushing/pulling   Barriers at home/work None as reported by patient   Medications See chart   Current HEP/Exercise Walking   Patient Reported Health good     Patient Goals:  1) up and down stairs without pain  2) no pain with bending knee      Other factors/PMH that may impact care: none      The history is provided by the patient.   Patient Health History  Elena Malik being seen for right knee pain.     Problem began: 2022.   Problem occurred: fall on right side    Pain is reported as 5/10 on pain scale.  General health as reported by patient is good.   Other medical history details: diabetes medication .     Current occupation is full time employee.                                  KNEE EVALUATION    Static Posture  No obvious findings; does prefer knee hyperextensino    Dynamic Movement Screen  Single leg stance observations: No significant findings for eyes open; does not pain when all weight is on right knee  Double limb squat observations: able to perform sit to stand from chair with good form x 5 reps without pain  Single limb squat observations: Not assessed    Gait: No significant findings    Range of Motion  Hip Joint Screen: Negative      Re: Elena Malik   :   1965        Knee ROM Extension Flexion   Left 10-0 130   Right 5-0 120     Hip and Knee Strength                       MMT Left Right   Hip Extension 4/5 4/5   Hip Abduction 4/5 4/5   Hip IR 4/5 4/5   Hip ER 4/5 4/5      Knee MMT Quadriceps set Straight Leg Raise   Left Good Good   Right Fair Fair     Knee ligaments and meniscus: Joint line tenderness laterally; all other ligament testing negative     Patellofemoral assessment: Lateral patellar tenderness otherwise unremarkable    Palpation  Left: No tenderness to palpation  Right: Mild tenderness to palpation at lateral joint line; over LCL, pain with palpation to lateral patella        Assessment/Plan:    Patient is a 56 year old female with right side knee complaints.    Patient has the following significant findings with corresponding treatment plan.                Diagnosis 1:  Right knee pain  Pain -  hot/cold therapy, electric stimulation, mechanical traction and manual therapy  Decreased ROM/flexibility - manual therapy and therapeutic exercise  Decreased joint mobility - manual therapy and therapeutic exercise  Decreased strength - therapeutic exercise and therapeutic activities  Impaired balance - neuro re-education and therapeutic activities  Impaired muscle performance - neuro re-education  Decreased function - therapeutic activities    Therapy Evaluation Codes:     1) Clinical presentation characteristics are:   Stable/Uncomplicated.  2) Decision-Making    Low complexity using standardized patient assessment instrument and/or measureable assessment of functional outcome.  Cumulative Therapy Evaluation is: Low complexity.    Re: Elena Malik   :   1965    Previous and current functional limitations:  (See Goal Flow Sheet for this information)    Short term and Long term goals: (See Goal Flow Sheet for this information)     Communication ability:  Patient has an  for communication clarity.  Treatment Explanation - The following has been discussed with the patient:   RX ordered/plan of care  Anticipated outcomes  Possible risks and side effects  This patient would benefit from PT intervention to resume normal activities.   Rehab potential is good.    Frequency:  2 X week, once daily  Duration:  for 6 weeks  Discharge Plan:  Achieve all LTG.  Independent in home treatment program.  Reach maximal therapeutic benefit.                                                                                   [facility]    OUTPATIENT Physical Therapy ORTHOPEDIC EVALUATION  PLAN OF TREATMENT FOR OUTPATIENT REHABILITATION  (COMPLETE FOR INITIAL  CLAIMS ONLY)  Patient's Last Name, First Name, M.I.  YOB: 1965  Elena Malik    Provider s Name:  [facility]   Medical Record No.  [id number]   Start of Care Date:  08/09/22   Onset Date:   06/08/22   Type:     _X__PT   ___OT Medical Diagnosis:    Encounter Diagnosis   Name Primary?     Right knee pain Yes        Treatment Diagnosis:  right knee pain        Goals:     08/09/22 0500   Body Part   Goals listed below are for right knee   Goal #1   Goal #1 stairs   Previous Functional Level No restrictions   Current Functional Level Ascend/descend stairs;in a normal reciprocal pattern   Performance Level with 5/10 pain   STG Target Performance Ascend/descend stairs;in a normal reciprocal pattern   Performance Level <2/10 pain   Rationale to reach upper level of home safely;to reach lower level of home safely;for safe community ambulaton   Due Date 09/06/22   LTG Target Performance Ascend/descend stairs;in a normal reciprocal pattern   Performance Level without pain   Rationale to reach upper level of home safely;to reach lower level of home safely;for safe community ambulaton   Due Date 09/20/22       Therapy Frequency:  2x/wk  Predicted Duration of Therapy Intervention:  6 weeks    Garrick Mazariegos, PT                 I CERTIFY THE NEED FOR THESE SERVICES FURNISHED UNDER        THIS PLAN OF TREATMENT AND WHILE UNDER MY CARE .             Physician Signature               Date    X_____________________________________________________                         Certification Date From:  08/09/22   Certification Date To:  10/08/22    Referring Provider:  Fallon Choudhary    Initial Assessment        See Epic Evaluation SOC Date: 08/09/22                                                                       Thank you for your referral.    INQUIRIES  Therapist: Dotty Nieto [facility] [address]  [facility] [address]  Phone: [phone]  Fax: [phone]

## 2022-08-09 NOTE — PROGRESS NOTES
Physical Therapy Initial Examination/Evaluation  August 9, 2022    Key PT Findings:   1) Lacking terminal extension and end range flexion   2) Pain with SLR and LAQ with quadriceps activation  3) All knee ligament testing negative; quadriceps/patellar tendon involvement or bone bruise on lateral condyle    Elena Malik is a 56 year old female referred to physical therapy by Dr. Fallon Choudhary MD for right knee pain Precautions/Restrictions/MD instructions E&T (12 visits)    Therapist Impression: Elena Malik presents to therapy with right knee pain. Currently demonstrates impairments with flexion range of motion, lateral sided discomfort and pain. Due to these impairments, Elena Malik is unable to go up stairs without pain, bend knee, and kneel painfree.      Subjective:  Presenting Complaint Right knee pain   Mechanism of Injury  Fall on right side   DOI (onset)/ DOS 6/8/2022   Functional Limitations Bending knee to sit, going up and down stairs, sometimes pain with kneeling    Notable PMH See Epic chart    Prior treatment Elevation   good   Prior Imaging  x-ray- found old fracture       Pain/Presentation    Pain Level   Rest: 0/10  ; Activity: 4/10   Location   lateral portion of knee; knee cap    Frequency Intermittent   Described as aching and dull    Alleviated by  Rest and Elevation   Progression of Sx Unchanged   Time of Day  Activity related and Position related   Sleeping  No issues/uninterrupted       Social Factors/Lifestyle  Occupation and Duties    prolonged sitting, prolonged standing, prolonged walking, lifting/carrying, pushing/pulling   Barriers at home/work None as reported by patient   Medications See chart   Current HEP/Exercise Walking   Patient Reported Health good     Patient Goals:  1) up and down stairs without pain  2) no pain with bending knee      Other factors/PMH that may impact care: none      The history is provided by the patient.   Patient Health History  Elena Malik  being seen for right knee pain.     Problem began: 6/8/2022.   Problem occurred: fall on right side    Pain is reported as 5/10 on pain scale.  General health as reported by patient is good.   Other medical history details: diabetes medication .                Current occupation is full time employee.                                       KNEE EVALUATION    Static Posture  No obvious findings; does prefer knee hyperextensino    Dynamic Movement Screen  Single leg stance observations: No significant findings for eyes open; does not pain when all weight is on right knee  Double limb squat observations: able to perform sit to stand from chair with good form x 5 reps without pain  Single limb squat observations: Not assessed    Gait: No significant findings    Range of Motion  Hip Joint Screen: Negative      Knee ROM Extension Flexion   Left 10-0 130   Right 5-0 120     Hip and Knee Strength                       MMT Left Right   Hip Extension 4/5 4/5   Hip Abduction 4/5 4/5   Hip IR 4/5 4/5   Hip ER 4/5 4/5      Knee MMT Quadriceps set Straight Leg Raise   Left Good Good   Right Fair Fair     Knee ligaments and meniscus: Joint line tenderness laterally; all other ligament testing negative     Patellofemoral assessment: Lateral patellar tenderness otherwise unremarkable    Palpation  Left: No tenderness to palpation  Right: Mild tenderness to palpation at lateral joint line; over LCL, pain with palpation to lateral patella     System    Physical Exam    General     ROS    Assessment/Plan:    Patient is a 56 year old female with right side knee complaints.    Patient has the following significant findings with corresponding treatment plan.                Diagnosis 1:  Right knee pain  Pain -  hot/cold therapy, electric stimulation, mechanical traction and manual therapy  Decreased ROM/flexibility - manual therapy and therapeutic exercise  Decreased joint mobility - manual therapy and therapeutic exercise  Decreased  strength - therapeutic exercise and therapeutic activities  Impaired balance - neuro re-education and therapeutic activities  Impaired muscle performance - neuro re-education  Decreased function - therapeutic activities    Therapy Evaluation Codes:     1) Clinical presentation characteristics are:   Stable/Uncomplicated.  2) Decision-Making    Low complexity using standardized patient assessment instrument and/or measureable assessment of functional outcome.  Cumulative Therapy Evaluation is: Low complexity.    Previous and current functional limitations:  (See Goal Flow Sheet for this information)    Short term and Long term goals: (See Goal Flow Sheet for this information)     Communication ability:  Patient has an  for communication clarity.  Treatment Explanation - The following has been discussed with the patient:   RX ordered/plan of care  Anticipated outcomes  Possible risks and side effects  This patient would benefit from PT intervention to resume normal activities.   Rehab potential is good.    Frequency:  2 X week, once daily  Duration:  for 6 weeks  Discharge Plan:  Achieve all LTG.  Independent in home treatment program.  Reach maximal therapeutic benefit.    Please refer to the daily flowsheet for treatment today, total treatment time and time spent performing 1:1 timed codes.

## 2022-08-09 NOTE — PROGRESS NOTES
[facility]    OUTPATIENT Physical Therapy ORTHOPEDIC EVALUATION  PLAN OF TREATMENT FOR OUTPATIENT REHABILITATION  (COMPLETE FOR INITIAL CLAIMS ONLY)  Patient's Last Name, First Name, M.I.  YOB: 1965  Elena Malik    Provider s Name:  [facility]   Medical Record No.  [id number]   Start of Care Date:  08/09/22   Onset Date:   06/08/22   Type:     _X__PT   ___OT Medical Diagnosis:    Encounter Diagnosis   Name Primary?    Right knee pain Yes        Treatment Diagnosis:  right knee pain        Goals:     08/09/22 0500   Body Part   Goals listed below are for right knee   Goal #1   Goal #1 stairs   Previous Functional Level No restrictions   Current Functional Level Ascend/descend stairs;in a normal reciprocal pattern   Performance Level with 5/10 pain   STG Target Performance Ascend/descend stairs;in a normal reciprocal pattern   Performance Level <2/10 pain   Rationale to reach upper level of home safely;to reach lower level of home safely;for safe community ambulaton   Due Date 09/06/22   LTG Target Performance Ascend/descend stairs;in a normal reciprocal pattern   Performance Level without pain   Rationale to reach upper level of home safely;to reach lower level of home safely;for safe community ambulaton   Due Date 09/20/22       Therapy Frequency:  2x/wk  Predicted Duration of Therapy Intervention:  6 weeks    Garrick Mazariegos, PT                 I CERTIFY THE NEED FOR THESE SERVICES FURNISHED UNDER        THIS PLAN OF TREATMENT AND WHILE UNDER MY CARE .             Physician Signature               Date    X_____________________________________________________                         Certification Date From:  08/09/22   Certification Date To:  10/08/22    Referring Provider:  Fallon Choudhary    Initial Assessment        See Epic Evaluation SOC Date: 08/09/22

## 2022-08-23 ENCOUNTER — HOSPITAL ENCOUNTER (EMERGENCY)
Facility: CLINIC | Age: 57
Discharge: HOME | End: 2022-08-23
Attending: EMERGENCY MEDICINE | Admitting: EMERGENCY MEDICINE
Payer: COMMERCIAL

## 2022-08-23 ENCOUNTER — APPOINTMENT (OUTPATIENT)
Dept: CT IMAGING | Facility: CLINIC | Age: 57
End: 2022-08-23
Attending: EMERGENCY MEDICINE
Payer: COMMERCIAL

## 2022-08-23 VITALS
HEART RATE: 65 BPM | WEIGHT: 152.9 LBS | OXYGEN SATURATION: 99 % | BODY MASS INDEX: 28.87 KG/M2 | DIASTOLIC BLOOD PRESSURE: 69 MMHG | RESPIRATION RATE: 16 BRPM | HEIGHT: 61 IN | TEMPERATURE: 98.5 F | SYSTOLIC BLOOD PRESSURE: 128 MMHG

## 2022-08-23 DIAGNOSIS — R51.9 DAILY HEADACHE: ICD-10-CM

## 2022-08-23 PROCEDURE — 99284 EMERGENCY DEPT VISIT MOD MDM: CPT | Performed by: EMERGENCY MEDICINE

## 2022-08-23 PROCEDURE — 250N000013 HC RX MED GY IP 250 OP 250 PS 637: Performed by: EMERGENCY MEDICINE

## 2022-08-23 PROCEDURE — 70450 CT HEAD/BRAIN W/O DYE: CPT

## 2022-08-23 PROCEDURE — 99284 EMERGENCY DEPT VISIT MOD MDM: CPT | Mod: 25 | Performed by: EMERGENCY MEDICINE

## 2022-08-23 RX ORDER — IBUPROFEN 600 MG/1
600 TABLET, FILM COATED ORAL ONCE
Status: COMPLETED | OUTPATIENT
Start: 2022-08-23 | End: 2022-08-23

## 2022-08-23 RX ADMIN — IBUPROFEN 600 MG: 600 TABLET ORAL at 12:03

## 2022-08-23 ASSESSMENT — ENCOUNTER SYMPTOMS
DIFFICULTY URINATING: 0
SEIZURES: 0
EYE REDNESS: 0
SHORTNESS OF BREATH: 0
HEADACHES: 1
NAUSEA: 0
COUGH: 0
DIZZINESS: 0
WEAKNESS: 0
CONFUSION: 0
DIARRHEA: 0
FEVER: 0
ABDOMINAL PAIN: 0
VOMITING: 0
BACK PAIN: 0

## 2022-08-23 ASSESSMENT — ACTIVITIES OF DAILY LIVING (ADL): ADLS_ACUITY_SCORE: 35
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
## 2022-10-08 ENCOUNTER — HOSPITAL ENCOUNTER (EMERGENCY)
Facility: CLINIC | Age: 57
Discharge: HOME | End: 2022-10-08
Attending: EMERGENCY MEDICINE | Admitting: EMERGENCY MEDICINE
Payer: COMMERCIAL

## 2022-10-08 VITALS
SYSTOLIC BLOOD PRESSURE: 140 MMHG | DIASTOLIC BLOOD PRESSURE: 66 MMHG | RESPIRATION RATE: 16 BRPM | TEMPERATURE: 98.1 F | OXYGEN SATURATION: 100 % | HEART RATE: 56 BPM

## 2022-10-08 DIAGNOSIS — R19.7 VOMITING AND DIARRHEA: ICD-10-CM

## 2022-10-08 DIAGNOSIS — H81.399 PERIPHERAL VERTIGO, UNSPECIFIED LATERALITY: ICD-10-CM

## 2022-10-08 DIAGNOSIS — R11.10 VOMITING AND DIARRHEA: ICD-10-CM

## 2022-10-08 LAB
ALBUMIN SERPL-MCNC: 3.4 G/DL (ref 3.4–5)
ALBUMIN UR-MCNC: NEGATIVE MG/DL
ALP SERPL-CCNC: 100 U/L (ref 40–150)
ALT SERPL W P-5'-P-CCNC: 21 U/L (ref 0–50)
ANION GAP SERPL CALCULATED.3IONS-SCNC: 7 MMOL/L (ref 3–14)
APPEARANCE UR: CLEAR
AST SERPL W P-5'-P-CCNC: 12 U/L (ref 0–45)
BASOPHILS # BLD AUTO: 0 10E3/UL (ref 0–0.2)
BASOPHILS NFR BLD AUTO: 1 %
BILIRUB SERPL-MCNC: 0.3 MG/DL (ref 0.2–1.3)
BILIRUB UR QL STRIP: NEGATIVE
BUN SERPL-MCNC: 14 MG/DL (ref 7–30)
CALCIUM SERPL-MCNC: 8.8 MG/DL (ref 8.5–10.1)
CHLORIDE BLD-SCNC: 109 MMOL/L (ref 94–109)
CO2 SERPL-SCNC: 24 MMOL/L (ref 20–32)
COLOR UR AUTO: ABNORMAL
CREAT SERPL-MCNC: 0.47 MG/DL (ref 0.52–1.04)
EOSINOPHIL # BLD AUTO: 0 10E3/UL (ref 0–0.7)
EOSINOPHIL NFR BLD AUTO: 1 %
ERYTHROCYTE [DISTWIDTH] IN BLOOD BY AUTOMATED COUNT: 13.2 % (ref 10–15)
GFR SERPL CREATININE-BSD FRML MDRD: >90 ML/MIN/1.73M2
GLUCOSE BLD-MCNC: 152 MG/DL (ref 70–99)
GLUCOSE BLDC GLUCOMTR-MCNC: 156 MG/DL (ref 70–99)
GLUCOSE UR STRIP-MCNC: NEGATIVE MG/DL
HCT VFR BLD AUTO: 37.7 % (ref 35–47)
HGB BLD-MCNC: 12.4 G/DL (ref 11.7–15.7)
HGB UR QL STRIP: NEGATIVE
IMM GRANULOCYTES # BLD: 0 10E3/UL
IMM GRANULOCYTES NFR BLD: 1 %
KETONES UR STRIP-MCNC: NEGATIVE MG/DL
LEUKOCYTE ESTERASE UR QL STRIP: NEGATIVE
LYMPHOCYTES # BLD AUTO: 1.2 10E3/UL (ref 0.8–5.3)
LYMPHOCYTES NFR BLD AUTO: 20 %
MCH RBC QN AUTO: 27.6 PG (ref 26.5–33)
MCHC RBC AUTO-ENTMCNC: 32.9 G/DL (ref 31.5–36.5)
MCV RBC AUTO: 84 FL (ref 78–100)
MONOCYTES # BLD AUTO: 0.3 10E3/UL (ref 0–1.3)
MONOCYTES NFR BLD AUTO: 5 %
MUCOUS THREADS #/AREA URNS LPF: PRESENT /LPF
NEUTROPHILS # BLD AUTO: 4.6 10E3/UL (ref 1.6–8.3)
NEUTROPHILS NFR BLD AUTO: 72 %
NITRATE UR QL: NEGATIVE
NRBC # BLD AUTO: 0 10E3/UL
NRBC BLD AUTO-RTO: 0 /100
PH UR STRIP: 6.5 [PH] (ref 5–7)
PLATELET # BLD AUTO: 244 10E3/UL (ref 150–450)
POTASSIUM BLD-SCNC: 4.1 MMOL/L (ref 3.4–5.3)
PROT SERPL-MCNC: 7.1 G/DL (ref 6.8–8.8)
RBC # BLD AUTO: 4.49 10E6/UL (ref 3.8–5.2)
RBC URINE: 0 /HPF
SODIUM SERPL-SCNC: 140 MMOL/L (ref 133–144)
SP GR UR STRIP: 1.01 (ref 1–1.03)
UROBILINOGEN UR STRIP-MCNC: NORMAL MG/DL
WBC # BLD AUTO: 6.3 10E3/UL (ref 4–11)
WBC URINE: 1 /HPF

## 2022-10-08 PROCEDURE — 81001 URINALYSIS AUTO W/SCOPE: CPT | Performed by: EMERGENCY MEDICINE

## 2022-10-08 PROCEDURE — 250N000011 HC RX IP 250 OP 636: Performed by: EMERGENCY MEDICINE

## 2022-10-08 PROCEDURE — 99284 EMERGENCY DEPT VISIT MOD MDM: CPT | Mod: 25 | Performed by: EMERGENCY MEDICINE

## 2022-10-08 PROCEDURE — 96374 THER/PROPH/DIAG INJ IV PUSH: CPT | Performed by: EMERGENCY MEDICINE

## 2022-10-08 PROCEDURE — 258N000003 HC RX IP 258 OP 636: Performed by: EMERGENCY MEDICINE

## 2022-10-08 PROCEDURE — 93005 ELECTROCARDIOGRAM TRACING: CPT | Performed by: EMERGENCY MEDICINE

## 2022-10-08 PROCEDURE — 96361 HYDRATE IV INFUSION ADD-ON: CPT | Performed by: EMERGENCY MEDICINE

## 2022-10-08 PROCEDURE — 93010 ELECTROCARDIOGRAM REPORT: CPT | Performed by: EMERGENCY MEDICINE

## 2022-10-08 PROCEDURE — 250N000009 HC RX 250: Performed by: EMERGENCY MEDICINE

## 2022-10-08 PROCEDURE — 36415 COLL VENOUS BLD VENIPUNCTURE: CPT | Performed by: EMERGENCY MEDICINE

## 2022-10-08 PROCEDURE — 80053 COMPREHEN METABOLIC PANEL: CPT | Performed by: EMERGENCY MEDICINE

## 2022-10-08 PROCEDURE — 99285 EMERGENCY DEPT VISIT HI MDM: CPT | Mod: 25 | Performed by: EMERGENCY MEDICINE

## 2022-10-08 PROCEDURE — 85004 AUTOMATED DIFF WBC COUNT: CPT | Performed by: EMERGENCY MEDICINE

## 2022-10-08 RX ORDER — SCOLOPAMINE TRANSDERMAL SYSTEM 1 MG/1
1 PATCH, EXTENDED RELEASE TRANSDERMAL ONCE
Status: DISCONTINUED | OUTPATIENT
Start: 2022-10-08 | End: 2022-10-08 | Stop reason: HOSPADM

## 2022-10-08 RX ORDER — ONDANSETRON 2 MG/ML
8 INJECTION INTRAMUSCULAR; INTRAVENOUS ONCE
Status: COMPLETED | OUTPATIENT
Start: 2022-10-08 | End: 2022-10-08

## 2022-10-08 RX ORDER — ONDANSETRON 4 MG/1
4 TABLET, FILM COATED ORAL EVERY 6 HOURS PRN
Qty: 18 TABLET | Refills: 0 | Status: SHIPPED | OUTPATIENT
Start: 2022-10-08

## 2022-10-08 RX ORDER — LOPERAMIDE HYDROCHLORIDE 2 MG/1
2 TABLET ORAL 4 TIMES DAILY PRN
Qty: 20 TABLET | Refills: 0 | Status: SHIPPED | OUTPATIENT
Start: 2022-10-08 | End: 2024-04-30

## 2022-10-08 RX ORDER — SCOLOPAMINE TRANSDERMAL SYSTEM 1 MG/1
1 PATCH, EXTENDED RELEASE TRANSDERMAL
Qty: 3 PATCH | Refills: 0 | Status: SHIPPED | OUTPATIENT
Start: 2022-10-08

## 2022-10-08 RX ORDER — MECLIZINE HCL 12.5 MG 12.5 MG/1
25 TABLET ORAL 3 TIMES DAILY PRN
Qty: 30 TABLET | Refills: 0 | Status: SHIPPED | OUTPATIENT
Start: 2022-10-08

## 2022-10-08 RX ADMIN — ONDANSETRON 8 MG: 2 INJECTION INTRAMUSCULAR; INTRAVENOUS at 10:01

## 2022-10-08 RX ADMIN — SODIUM CHLORIDE 1000 ML: 9 INJECTION, SOLUTION INTRAVENOUS at 10:01

## 2022-10-08 RX ADMIN — SCOPALAMINE 1 PATCH: 1 PATCH, EXTENDED RELEASE TRANSDERMAL at 11:31

## 2022-10-08 ASSESSMENT — ACTIVITIES OF DAILY LIVING (ADL)
ADLS_ACUITY_SCORE: 35
ADLS_ACUITY_SCORE: 35

## 2022-10-08 NOTE — ED PROVIDER NOTES
"ED Provider Note  [facility]      History   CC: Dizziness     HPI  Elena Malik is a 56 year old female with PMH notable for DMII on metformin who presents to the ED with one day of dizziness, nausea, vomiting. She describes the dizziness as \"room spinning\" but does not feel like she will faint. Her symptoms of dizziness, nausea, and vomiting started last night and she was able to sleep \"on and off.\" She reports around 0400 this AM she started to have loose stool, has had 2 thus far. She has had multiple similar episodes in the past but reports this time is more severe. She took meclizine once last night and once this AM prior to coming to the ED without relief. She has vomited 3x since symptom onset. Denies headache but endorses these in there past. Denies tinnitus, hearing loss, vision changes. No weakness, numbness/tingling. She does note she recently recovered from a cold approximately 3 days ago. Patient also endorses left sided low back/SI pain that has waxed and waned for several months. Patient was recently seen in the ED on 8/23/22 for persistent headache after she sustained a head injury in July. Workup was negative for intracranial bleed or other pathology at that time.     Past Medical History  Past Medical History:   Diagnosis Date     Diabetes (H)      History reviewed. No pertinent surgical history.  meclizine (ANTIVERT) 12.5 MG tablet  scopolamine (TRANSDERM) 1 MG/3DAYS 72 hr patch      No Known Allergies  Family History  History reviewed. No pertinent family history.  Social History   Social History     Tobacco Use     Smoking status: Never Smoker     Smokeless tobacco: Never Used   Substance Use Topics     Alcohol use: Not Currently     Drug use: Not Currently      Past medical history, past surgical history, medications, allergies, family history, and social history were reviewed with the patient. No additional pertinent items.      Review Of Systems  Skin: negative " for rash  Eyes: negative, visual blurring, redness. Positive for photophobia  Ears/Nose/Throat: negative, nasal congestion, hearing loss, tinnitus. Positive for vertigo.  Respiratory: No shortness of breath, dyspnea on exertion, cough, or hemoptysis  Cardiovascular: negative, palpitations and chest pain  Gastrointestinal: positive for nausea, vomiting, diarrhea. Negative for hematemesis, hematochezia, melena.  Genitourinary: negative for dysuria, urinary frequency  Musculoskeletal: negative for muscular weakness  Neurologic: negative for migraine headaches, headaches, local weakness, numbness or tingling of hands, numbness or tingling of feet and incoordination  Psychiatric: negative  Hematologic/Lymphatic/Immunologic: negative  Endocrine: positive for and diabetes     Physical Exam      Physical Exam  General: no acute distress. Appears stated age.   HENT: MMM, no oropharyngeal lesions  Eyes: PERRL, normal sclerae, no conjunctival pallor. Negative Torrey-Hallpike  Neck: non-tender, supple  Cardio: Bradycardic. Regular rhythm. Extremities well perfused  Resp: Normal work of breathing, normal respiratory rate.  Chest/Back: no visual signs of trauma, non tender over the cervical, thoracic, lumbar spinous processes. Neck ROM full and nonpainful. Positive right sided CVA tenderness  Abdomen: no tenderness, non-distended, no rebound, no guarding  Neuro: alert and fully oriented. No confusion. CN II-XII intact to testing. Strength left: 5/5 , 5/5 elbow flexion, 5/5 elbow extension, 5/5 shoulder abduction, 5/5 hip flexion, 5/5 knee flexion, 5/5 knee extension. Strength right: 5/5 , 5/5 elbow flexion, 5/5 elbow extension, 5/5 shoulder abduction, 5/5 hip flexion, 5/5 knee flexion, 5/5 knee extension. Sensation intact to soft touch in all extremities. No pronator drift. 2+ brachial and patellar reflexes. Normal tone, no clonus. Normal coordination with finger-nose. Normal gait.  Torrey-Hallpike maneuver reproduced symptoms  primarily with right ear down, but nystagmus was not visualized.  Head impulse testing with slightly delayed saccade suggestive of right ear etiology.  No skew.  MSK: no deformities. Grossly normal ROM in extremities.   Integumentary/Skin: no rash visualized, normal color  Psych: normal affect, normal behavior     ED Course   Procedures            EKG Interpretation:      Interpreted by Dr. Eric Crouch  Time reviewed: 0845  Symptoms at time of EKG: Dizziness  Rhythm: sinus bradycardia  Rate: normal  Axis: normal  Ectopy: none  Conduction: normal  ST Segments/ T Waves: No ST-T wave changes  Q Waves: none  Comparison to prior: Unchanged from 11/8/21    Clinical Impression: normal EKG    Results for orders placed or performed during the hospital encounter of 10/08/22 (from the past 24 hour(s))   EKG 12 lead   Result Value Ref Range    Systolic Blood Pressure  mmHg    Diastolic Blood Pressure  mmHg    Ventricular Rate 56 BPM    Atrial Rate 56 BPM    LA Interval 170 ms    QRS Duration 82 ms     ms    QTc 409 ms    P Axis 29 degrees    R AXIS 35 degrees    T Axis 34 degrees    Interpretation ECG Sinus bradycardia  Otherwise normal ECG      Glucose by meter   Result Value Ref Range    GLUCOSE BY METER POCT 156 (H) 70 - 99 mg/dL   Comprehensive metabolic panel   Result Value Ref Range    Sodium 140 133 - 144 mmol/L    Potassium 4.1 3.4 - 5.3 mmol/L    Chloride 109 94 - 109 mmol/L    Carbon Dioxide (CO2) 24 20 - 32 mmol/L    Anion Gap 7 3 - 14 mmol/L    Urea Nitrogen 14 7 - 30 mg/dL    Creatinine 0.47 (L) 0.52 - 1.04 mg/dL    Calcium 8.8 8.5 - 10.1 mg/dL    Glucose 152 (H) 70 - 99 mg/dL    Alkaline Phosphatase 100 40 - 150 U/L    AST 12 0 - 45 U/L    ALT 21 0 - 50 U/L    Protein Total 7.1 6.8 - 8.8 g/dL    Albumin 3.4 3.4 - 5.0 g/dL    Bilirubin Total 0.3 0.2 - 1.3 mg/dL    GFR Estimate >90 >60 mL/min/1.73m2   CBC with platelets differential    Narrative    The following orders were created for panel order CBC with  platelets differential.  Procedure                               Abnormality         Status                     ---------                               -----------         ------                     CBC with platelets and d...[[id number]]                      Final result                 Please view results for these tests on the individual orders.   CBC with platelets and differential   Result Value Ref Range    WBC Count 6.3 4.0 - 11.0 10e3/uL    RBC Count 4.49 3.80 - 5.20 10e6/uL    Hemoglobin 12.4 11.7 - 15.7 g/dL    Hematocrit 37.7 35.0 - 47.0 %    MCV 84 78 - 100 fL    MCH 27.6 26.5 - 33.0 pg    MCHC 32.9 31.5 - 36.5 g/dL    RDW 13.2 10.0 - 15.0 %    Platelet Count 244 150 - 450 10e3/uL    % Neutrophils 72 %    % Lymphocytes 20 %    % Monocytes 5 %    % Eosinophils 1 %    % Basophils 1 %    % Immature Granulocytes 1 %    NRBCs per 100 WBC 0 <1 /100    Absolute Neutrophils 4.6 1.6 - 8.3 10e3/uL    Absolute Lymphocytes 1.2 0.8 - 5.3 10e3/uL    Absolute Monocytes 0.3 0.0 - 1.3 10e3/uL    Absolute Eosinophils 0.0 0.0 - 0.7 10e3/uL    Absolute Basophils 0.0 0.0 - 0.2 10e3/uL    Absolute Immature Granulocytes 0.0 <=0.4 10e3/uL    Absolute NRBCs 0.0 10e3/uL        Assessments & Plan (with Medical Decision Making)   Patient presenting with dizziness, nausea, vomiting, diarrhea. Vitals in the ED notable for mild bradycardia, HTN, but stable. Nursing notes reviewed. Initial differential diagnosis includes but not limited to peripheral vertigo such as BPPV or vestibular neuronitis, central vertigo, atypical migraine, electrolyte abnormality, UTI.    Less likely includes intracranial mass, cerebellar stroke, subdural hematoma. Does have a distant history of head trauma but has been evaluated and imaged multiple times over the last few months without any noted intracranial pathology. Patient seen for similar presentation on 11/8/21. Imaging including brain MRI and MRA all negative for acute findings. Based on patient  history and clinical exam most likely is peripheral vertigo.  Given the history and exam findings, central causes of vertigo such as cerebellar CVA are very unlikely. Do not feel further head imaging is warranted at this time.     Basic labs obtained, including UA given right sided CVA tenderness. No significant findings in labs testing.    In the ED, the patient's symptoms were managed with fluids, zofran, and scopolamine patch with moderate improvement in symptoms upon reassessment.      The complete clinical picture is most consistent with peripheral vertigo. After counseling on the diagnosis, work-up, and treatment plan, the patient was discharged to home. Discharged with prescriptions for scopolamine patch and meclizine. The patient was advised to follow-up with outpatient neurology at their earliest convenience The patient was advised to return to the ED if worsening symptoms, or if there are any urgent/life-threatening concerns.      Final diagnoses:   Peripheral vertigo               --  Mao Lamar MS4  Emergency Medicine Service          --    ED Attending Physician Attestation    RANDY Crouch MD, cared for this patient with the Medical Student. I performed, or re-performed, the physical exam and medical decision-making. I have verified the accuracy of all the medical student findings and documentation above, and have edited as necessary.    Summary of HPI, PE, ED Course   Patient is a 56 year old female evaluated in the emergency department for vertigo. Exam notable for Torrey-Hallpike reproducing symptoms with right ear down and delayed saccade suggestive of right ear etiology; no coordination deficit nor skew to suggest cerebellar CVA. ED course notable for improvement after ondansetron and scopolamine. After the completion of care in the emergency department, the patient was discharged.    Critical Care & Procedures  Not applicable.    Medical Decision Making  The medical record was reviewed and  interpreted.  Current labs reviewed and interpreted.  Previous images reviewed and interpreted: MRI brain in 2021 unremarkable.  EKG reviewed and interpreted: EKG showed sinus mild tachycardia without evidence of acute ischemia.  Managed outpatient prescription medications.      Eric Crouch MD  Emergency Medicine          Eric Crouch MD  10/08/22 4147

## 2022-10-08 NOTE — DISCHARGE INSTRUCTIONS
Instructions from your doctor today:  Emergency Department testing is focused on the potential causes of your symptoms that are the most dangerous possibilities, and cannot cover every possibility. Based on the evaluation, it was deemed sufficiently safe to discharge and continue management through the clinics. Thus, follow-up is very important to assess for improvement/worsening, potential further testing, and potential treatment adjustments. If you were given opioid pain medications or other medications that can make you drowsy while in the ED, you should not drive for at least several hours and not until you feel completely back to normal.     Please make an appointment to follow up with:  - Your Primary Care Provider within a week, and Ear Nose and Throat (ENT) Clinic (phone: [phone]) as soon as possible. An ENT referral has been placed and they should call you to schedule but if they have not called by Monday afternoon then call the above number to schedule.   - If you do not have a primary care provider, you can be seen in follow-up and establish care by calling any of the clinics below:     - [facility] (phone: [phone])     - Primary Care / [facility] (phone: [phone])   - Have your clinic provider review the results from today's visit with you again, including any potential follow-up or additional testing that may be needed based on the results. Occasionally, incidental findings are found on later review by radiologists that may need follow-up.     Return to the Emergency Department immediately if you have worsening symptoms, or any other urgent or potentially life-threatening concerns.

## 2022-10-08 NOTE — LETTER
October 8, 2022      To Whom It May Concern:      Elena Malik was seen in our Emergency Department today, 10/08/22.  I expect her condition to improve over the next 4-7 days.  She may return to work/school when improved.    Sincerely,        Eric Crouch MD  Emergency Medicine

## 2022-10-08 NOTE — ED TRIAGE NOTES
Pt started experiencing dizziness last evening and took her prescribed meclozine. Pt states it didn't help but thought overtime it would get better. Pt states this morning around 0400 she started having nausea and vomiting. 3 episodes of vomiting and 2 episodes of diarrhea. Pt is a type 2 diabetic, takes metformin at home, no insulin, has not taken her BS since yesterday. Pt states she did not eat breakfast yesterday, but did eat dinner around 6:30pm. Pt is having some light sensitivity. Pt denies chest pain, arm pain, headache or any pain, besides some right sided abd/flank pain.      Triage Assessment     Row Name 10/08/22 0819       Triage Assessment (Adult)    Airway WDL WDL       Respiratory WDL    Respiratory WDL WDL       Skin Circulation/Temperature WDL    Skin Circulation/Temperature WDL WDL       Cardiac WDL    Cardiac WDL WDL       Peripheral/Neurovascular WDL    Peripheral Neurovascular WDL WDL       Cognitive/Neuro/Behavioral WDL    Cognitive/Neuro/Behavioral WDL WDL

## 2022-10-08 NOTE — ED NOTES
Pt asking if she can stay in the ED until her dizziness subsides and is wondering if she can get another meclozine. MD notified. Pt has her own script of meclozine with her, MD advised her to take a dose of hers. Water given to pt to take a dose of hers. New scripts sent to upstairs pharmacy. MD put referral in for ENT. Pt notified that MD is unsure of when her dizziness is going to subside and all emergent issues have been ruled out. Pt and daughter notified. Staff got a wheelchair for pt and helped in get to the lobby and daughter is to pull car into the [place]. All questions answered.

## 2022-10-10 LAB
ATRIAL RATE - MUSE: 56 BPM
DIASTOLIC BLOOD PRESSURE - MUSE: NORMAL MMHG
INTERPRETATION ECG - MUSE: NORMAL
P AXIS - MUSE: 29 DEGREES
PR INTERVAL - MUSE: 170 MS
QRS DURATION - MUSE: 82 MS
QT - MUSE: 424 MS
QTC - MUSE: 409 MS
R AXIS - MUSE: 35 DEGREES
SYSTOLIC BLOOD PRESSURE - MUSE: NORMAL MMHG
T AXIS - MUSE: 34 DEGREES
VENTRICULAR RATE- MUSE: 56 BPM

## 2022-10-12 ENCOUNTER — TELEPHONE (OUTPATIENT)
Dept: OTOLARYNGOLOGY | Facility: CLINIC | Age: 57
End: 2022-10-12

## 2022-10-12 NOTE — TELEPHONE ENCOUNTER
[facility] Call Center    Phone Message    May a detailed message be left on voicemail: yes     Reason for Call: Appointment Intake    Referring Provider Name: Eric Crouch MD  Diagnosis and/or Symptoms: Peripheral vertigo, unspecified laterality [H81.399]      [facility]  Action Taken: Other: ENT    Travel Screening: Not Applicable

## 2022-10-26 ENCOUNTER — TELEPHONE (OUTPATIENT)
Dept: OTOLARYNGOLOGY | Facility: CLINIC | Age: 57
End: 2022-10-26

## 2022-11-30 NOTE — TELEPHONE ENCOUNTER
1/18/2023-Spoke with [facility] to have images pushed ASAP-MR @ 738am    1/20/2023-[facility] now in PACS-MR @ 422am    FUTURE VISIT INFORMATION      FUTURE VISIT INFORMATION:    Date: 1/23/2023    Time: 130pm    Location: [facility] [place]  REFERRAL INFORMATION:    Referring provider:  Dr. Powers     Referring providers clinic:  [place]     Reason for visit/diagnosis  Headaches     RECORDS REQUESTED FROM:       Clinic name Comments Records Status Imaging Status   Internal ED Visit-8/23/2022    CT Head-8/23/2022    MR Brain-11/8/2021    MRA Neck/Brain-11/8/2021 [facility] PACS         [facility] Requested

## 2022-12-21 PROBLEM — M25.561 RIGHT KNEE PAIN: Status: RESOLVED | Noted: 2022-08-09 | Resolved: 2022-12-21

## 2023-01-23 ENCOUNTER — PRE VISIT (OUTPATIENT)
Dept: NEUROLOGY | Facility: CLINIC | Age: 58
End: 2023-01-23
Payer: COMMERCIAL

## 2023-09-19 ENCOUNTER — OFFICE VISIT (OUTPATIENT)
Dept: INTERNAL MEDICINE | Facility: CLINIC | Age: 58
End: 2023-09-19
Payer: COMMERCIAL

## 2023-09-19 VITALS
BODY MASS INDEX: 27.51 KG/M2 | OXYGEN SATURATION: 98 % | SYSTOLIC BLOOD PRESSURE: 143 MMHG | HEART RATE: 57 BPM | HEIGHT: 61 IN | DIASTOLIC BLOOD PRESSURE: 67 MMHG | WEIGHT: 145.7 LBS

## 2023-09-19 DIAGNOSIS — R03.0 ELEVATED BP WITHOUT DIAGNOSIS OF HYPERTENSION: ICD-10-CM

## 2023-09-19 DIAGNOSIS — G89.29 CHRONIC MIDLINE LOW BACK PAIN WITH LEFT-SIDED SCIATICA: ICD-10-CM

## 2023-09-19 DIAGNOSIS — E78.5 DYSLIPIDEMIA: ICD-10-CM

## 2023-09-19 DIAGNOSIS — M54.42 CHRONIC MIDLINE LOW BACK PAIN WITH LEFT-SIDED SCIATICA: ICD-10-CM

## 2023-09-19 DIAGNOSIS — Z00.00 ENCOUNTER FOR PREVENTIVE CARE: Primary | ICD-10-CM

## 2023-09-19 DIAGNOSIS — M54.41 CHRONIC MIDLINE LOW BACK PAIN WITH RIGHT-SIDED SCIATICA: ICD-10-CM

## 2023-09-19 DIAGNOSIS — E11.9 TYPE 2 DIABETES MELLITUS WITHOUT COMPLICATION, WITHOUT LONG-TERM CURRENT USE OF INSULIN (H): ICD-10-CM

## 2023-09-19 DIAGNOSIS — G89.29 CHRONIC MIDLINE LOW BACK PAIN WITH RIGHT-SIDED SCIATICA: ICD-10-CM

## 2023-09-19 PROCEDURE — 99386 PREV VISIT NEW AGE 40-64: CPT | Performed by: NURSE PRACTITIONER

## 2023-09-19 NOTE — NURSING NOTE
Elena Malik is a 57 year old female patient that presents today in clinic for the following:    Chief Complaint   Patient presents with    Establish Care     Physical, med refills, discuss A1C and cholesterol, lower back and down thighs numbness when lifting      The patient's allergies and medications were reviewed as noted. A set of vitals were recorded as noted without incident. The patient does not have any other questions for the provider.    Nadine Tolentino, EMT at 6:21 PM on 9/19/2023

## 2023-09-19 NOTE — PATIENT INSTRUCTIONS
Check BP at home about 4X /week, record  Send me a [employer] message if > 140/80 two or more times per week    To schedule your XR, and Mammogram appointment with imaging, please call [phone].

## 2023-09-19 NOTE — PROGRESS NOTES
"Ms. Malik is a 57 year old female with history of T2DM, vertigo, headaches presents to establish care and for medication refills. Accompanied by daughter who is translating.  She is new to me.    History of Present Illness:    T2DM:  A1C 22 7.1  On metformin 850 mg twice daily  Home BG checks daily.  Fasting -150      Blood pressure:    Has home BP cuff.      Dyslipidemia:  22    TChol 212; Triglycerides 263        Low back pain  Bilateral with pain radiating to knees  Especially with heavy lifting, then resolves - works as PCA  Started a few years ago, resolved.  Recurrent X 2-3 months  No pain medication      Reproductive/Sexual Health:    LMP:  13 years ago  Menarche age   Menses/ Vaginal bleeding:   None  Paps:   ,  Negative, No high risk HPV   Breasts:  Ocassional tenderness     Obstetric History          OB History    Para Term  AB Living   02 0 0 0 0 0   SAB IAB Ectopic Multiple Live Births    0 0 0 0 0           Prevention  Due:  Mammogram  Colonoscopy      Review of external notes as documented above       Past Medical History:  Past Medical History:   Diagnosis Date    Diabetes (H)        Active Meds:  Current Outpatient Medications   Medication    loperamide (IMODIUM A-D) 2 MG tablet    meclizine (ANTIVERT) 12.5 MG tablet    ondansetron (ZOFRAN) 4 MG tablet    scopolamine (TRANSDERM) 1 MG/3DAYS 72 hr patch     No current facility-administered medications for this visit.        Allergies:  Reviewed, refer to EMR      A full 10-pt Review of Systems was performed, verified and is negative except as documented in the HPI.  All health questionnaires were reviewed, verified and relevant information documented above.      Physical Exam:  Vitals:   BP (!) 143/67 (BP Location: Right arm, Patient Position: Sitting, Cuff Size: Adult Regular)   Pulse 57   Ht 1.549 m (5' 1\")   Wt 66.1 kg (145 lb 11.2 oz)   SpO2 98%   BMI 27.53 kg/m      Constitutional: Alert, oriented, pleasant, " no acute distress  Head: Normocephalic, atraumatic  Eyes: Extra-ocular movements intact, pupils equally round and reactive bilaterally, no scleral icterus  ENT: Oropharynx clear, moist mucus membranes, good dentition  Neck: Supple, no lymphadenopathy, no thyromegaly, no JVD  Cardiovascular: Regular rate and rhythm, no murmurs, rubs or gallops, peripheral pulses full/symmetric  Respiratory: Good air movement bilaterally, lungs clear, no wheezes/rales/rhonchi  Breasts: Uniformly soft bilaterally, without masses or lesions, nontender.  No clavicular or axillary lymphadenopathy  GI: Abdomen soft, bowel sounds present, nondistended, nontender, no organomegaly or masses, no rebound/guarding  Musculoskeletal: No edema, normal muscle tone, normal gait  Neurologic: Alert and oriented, cranial nerves 2-12 intact, strength 5/5 throughout, sensation to light touch intact  Skin: No rashes/lesions  Psychiatric: normal mentation, affect and mood        Assessment and Plan:    (Z00.00) Encounter for preventive care  (primary encounter diagnosis)  Plan: MA Screening Digital Bilateral,   Colonoscopy Screening  Referral,   CBC with platelets,   Vitamin D Deficiency           (R03.0)  Elevated BP  Comment:  Suspect hypertension.  Additional BP readings needed.  Plan:  Check BP at home about 4X /week, record  Send me a [place]a message if > 140/80 two or more times per week      (E11.9) Type 2 diabetes mellitus without complication, without long-term current use of insulin (H)  (E78.5) Dyslipidemia  Comment:  Updated lab testing needed  Plan: Comprehensive metabolic panel (BMP + Alb, Alk Phos, ALT, AST, Total. Bili, TP),   Lipid panel reflex to direct LDL Fasting,   Hemoglobin A1c,   Blood glucose monitoring (NO BRAND SPECIFIED) meter device kit,   metFORMIN (GLUCOPHAGE) 850 MG tablet           (M54.41,  G89.29) Chronic midline low back pain with right-sided sciatica  (M54.42,  G89.29) Chronic midline low back pain with  left-sided sciatica  Plan: XR Lumbar Spine 2-3 Views       #Routine Health Maintenence:  Immunizations (zoster, pneumovax, flu, Tdap, Hep A/B):   Most Recent Immunizations   Administered Date(s) Administered    COVID-19 MONOVALENT 12+ (Pfizer) 01/11/2021       Return to clinic:  As needed and with no improvement, worsening, or new symptom development.     Options for treatment and follow-up care were reviewed with the patient. She engaged in the decision making process and verbalized understanding of the options discussed and agreed with the final plan.    I spent a total of 45 minutes in the care of this pt today, including time prior to, during, and following today's office visit. This time includes reviewing the patient's chart and prior history, external notes, obtaining a history, performing an examination, interpreting test results, and evaluation and counseling the patient. This time also includes ordering medications or tests necessary in addition to communication to other member's of the patient's health care team. Time spent in documentation and care coordination is included.    BRISA Holland, [facility]  Nurse Practitioner      Addendum 9/21/23:  A1c 7.5  Atorvastatin 20 mg added with lipid panel and ALT follow-up in 2 months  Schedule follow-up visit to discuss options for improved glycemic control    Merlyn Shelby NP

## 2023-09-20 ENCOUNTER — LAB (OUTPATIENT)
Dept: LAB | Facility: CLINIC | Age: 58
End: 2023-09-20
Payer: COMMERCIAL

## 2023-09-20 DIAGNOSIS — Z00.00 ENCOUNTER FOR PREVENTIVE CARE: ICD-10-CM

## 2023-09-20 DIAGNOSIS — E11.9 TYPE 2 DIABETES MELLITUS WITHOUT COMPLICATION, WITHOUT LONG-TERM CURRENT USE OF INSULIN (H): ICD-10-CM

## 2023-09-20 LAB
ALBUMIN SERPL BCG-MCNC: 4.3 G/DL (ref 3.5–5.2)
ALP SERPL-CCNC: 104 U/L (ref 35–104)
ALT SERPL W P-5'-P-CCNC: 15 U/L (ref 0–50)
ANION GAP SERPL CALCULATED.3IONS-SCNC: 10 MMOL/L (ref 7–15)
AST SERPL W P-5'-P-CCNC: 17 U/L (ref 0–45)
BILIRUB SERPL-MCNC: 0.3 MG/DL
BUN SERPL-MCNC: 12.6 MG/DL (ref 6–20)
CALCIUM SERPL-MCNC: 9.4 MG/DL (ref 8.6–10)
CHLORIDE SERPL-SCNC: 103 MMOL/L (ref 98–107)
CHOLEST SERPL-MCNC: 185 MG/DL
CREAT SERPL-MCNC: 0.53 MG/DL (ref 0.51–0.95)
DEPRECATED HCO3 PLAS-SCNC: 25 MMOL/L (ref 22–29)
EGFRCR SERPLBLD CKD-EPI 2021: >90 ML/MIN/1.73M2
ERYTHROCYTE [DISTWIDTH] IN BLOOD BY AUTOMATED COUNT: 13.4 % (ref 10–15)
GLUCOSE SERPL-MCNC: 144 MG/DL (ref 70–99)
HBA1C MFR BLD: 7.5 %
HCT VFR BLD AUTO: 38.3 % (ref 35–47)
HDLC SERPL-MCNC: 51 MG/DL
HGB BLD-MCNC: 12.8 G/DL (ref 11.7–15.7)
LDLC SERPL CALC-MCNC: 109 MG/DL
MCH RBC QN AUTO: 28.1 PG (ref 26.5–33)
MCHC RBC AUTO-ENTMCNC: 33.4 G/DL (ref 31.5–36.5)
MCV RBC AUTO: 84 FL (ref 78–100)
NONHDLC SERPL-MCNC: 134 MG/DL
PLATELET # BLD AUTO: 264 10E3/UL (ref 150–450)
POTASSIUM SERPL-SCNC: 4.4 MMOL/L (ref 3.4–5.3)
PROT SERPL-MCNC: 7.4 G/DL (ref 6.4–8.3)
RBC # BLD AUTO: 4.55 10E6/UL (ref 3.8–5.2)
SODIUM SERPL-SCNC: 138 MMOL/L (ref 136–145)
TRIGL SERPL-MCNC: 123 MG/DL
VIT B12 SERPL-MCNC: 480 PG/ML (ref 232–1245)
WBC # BLD AUTO: 5.2 10E3/UL (ref 4–11)

## 2023-09-20 PROCEDURE — 82306 VITAMIN D 25 HYDROXY: CPT | Performed by: NURSE PRACTITIONER

## 2023-09-20 PROCEDURE — 83036 HEMOGLOBIN GLYCOSYLATED A1C: CPT | Performed by: NURSE PRACTITIONER

## 2023-09-20 PROCEDURE — 80061 LIPID PANEL: CPT | Performed by: PATHOLOGY

## 2023-09-20 PROCEDURE — 36415 COLL VENOUS BLD VENIPUNCTURE: CPT | Performed by: PATHOLOGY

## 2023-09-20 PROCEDURE — 99000 SPECIMEN HANDLING OFFICE-LAB: CPT | Performed by: PATHOLOGY

## 2023-09-20 PROCEDURE — 82607 VITAMIN B-12: CPT | Performed by: NURSE PRACTITIONER

## 2023-09-20 PROCEDURE — 85027 COMPLETE CBC AUTOMATED: CPT | Performed by: PATHOLOGY

## 2023-09-20 PROCEDURE — 80053 COMPREHEN METABOLIC PANEL: CPT | Performed by: PATHOLOGY

## 2023-09-21 ENCOUNTER — TELEPHONE (OUTPATIENT)
Dept: CARE COORDINATION | Facility: CLINIC | Age: 58
End: 2023-09-21
Payer: COMMERCIAL

## 2023-09-21 RX ORDER — ATORVASTATIN CALCIUM 20 MG/1
20 TABLET, FILM COATED ORAL DAILY
Qty: 90 TABLET | Refills: 3 | Status: SHIPPED | OUTPATIENT
Start: 2023-09-21

## 2023-09-21 NOTE — TELEPHONE ENCOUNTER
Spoke with patients daughter Melissa informed her of patients lab results and Merlyn Adams recommendations.  Melissa is going to call to schedule the lab appointment.  Will have clinic coordinators contact Melissa to schedule a 60 min appointment in the near future per Merlyn servin.     Jennifer Torres RN on 9/21/2023 at 2:25 PM

## 2023-09-21 NOTE — TELEPHONE ENCOUNTER
----- Message from Merlyn Shelby NP sent at 9/21/2023  6:55 AM CDT -----  Regarding: Phone call elevated A1C  Rusty Baker,    Can you please call Elena's daughter Melissa (Elena has no MyChart, non-Eglish speaker) re Elena's elevated A1c 7.5 and needs medication adjustments.    I've added atorvastatin 20 mg per guidelines.  She should take this in the evening.  Fasting lipid panel and ALT in 2 months.    Please schedule Elena for a 60 min appt with me in the near future to discuss medication adjustments for improved BG control (prob adding another medication) and the other issues we didn't get to at her 1st visit with me (low back pain primarily I think).    If daughter Melissa is able to manage (I'm guessing she would need to attend with her mother), Diabetes Ed would be good - otherwise please discuss decr carb diet, avoiding sweets, decr saturated fats, increased physical activity.    Thanks so much, Merlyn

## 2023-09-22 LAB — DEPRECATED CALCIDIOL+CALCIFEROL SERPL-MC: 25 UG/L (ref 20–75)

## 2023-09-26 ENCOUNTER — ANCILLARY PROCEDURE (OUTPATIENT)
Dept: GENERAL RADIOLOGY | Facility: CLINIC | Age: 58
End: 2023-09-26
Attending: NURSE PRACTITIONER
Payer: COMMERCIAL

## 2023-09-26 DIAGNOSIS — M54.42 CHRONIC MIDLINE LOW BACK PAIN WITH LEFT-SIDED SCIATICA: ICD-10-CM

## 2023-09-26 DIAGNOSIS — M54.41 CHRONIC MIDLINE LOW BACK PAIN WITH RIGHT-SIDED SCIATICA: ICD-10-CM

## 2023-09-26 DIAGNOSIS — G89.29 CHRONIC MIDLINE LOW BACK PAIN WITH LEFT-SIDED SCIATICA: ICD-10-CM

## 2023-09-26 DIAGNOSIS — G89.29 CHRONIC MIDLINE LOW BACK PAIN WITH RIGHT-SIDED SCIATICA: ICD-10-CM

## 2023-09-26 PROCEDURE — 72100 X-RAY EXAM L-S SPINE 2/3 VWS: CPT | Performed by: STUDENT IN AN ORGANIZED HEALTH CARE EDUCATION/TRAINING PROGRAM

## 2023-10-13 ENCOUNTER — TELEPHONE (OUTPATIENT)
Dept: GASTROENTEROLOGY | Facility: CLINIC | Age: 58
End: 2023-10-13
Payer: COMMERCIAL

## 2023-10-13 ENCOUNTER — HOSPITAL ENCOUNTER (OUTPATIENT)
Facility: AMBULATORY SURGERY CENTER | Age: 58
End: 2023-10-13
Attending: STUDENT IN AN ORGANIZED HEALTH CARE EDUCATION/TRAINING PROGRAM
Payer: COMMERCIAL

## 2023-10-13 NOTE — TELEPHONE ENCOUNTER
"Endoscopy Scheduling Screen    Have you had a positive Covid test in the last 14 days?  No    Are you active on MyChart?   No    What insurance is in the chart?  Other:  [facility]    Ordering/Referring Provider:   JEF HERMOSILLO        (If ordering provider performs procedure, schedule with ordering provider unless otherwise instructed. )    BMI: Estimated body mass index is 27.53 kg/m  as calculated from the following:    Height as of 9/19/23: 1.549 m (5' 1\").    Weight as of 9/19/23: 66.1 kg (145 lb 11.2 oz).     Sedation Ordered  moderate sedation.   If patient BMI > 50 do not schedule in [facility].    If patient BMI > 45 do not schedule at [facility].    Are you taking methadone or Suboxone?  No    Are you taking any prescription medications for pain 3 or more times per week?   No    Do you have a history of malignant hyperthermia or adverse reaction to anesthesia?  No    (Females) Are you currently pregnant?   No     Have you been diagnosed or told you have pulmonary hypertension?   No    Do you have an LVAD?  No    Have you been told you have moderate to severe sleep apnea?  No    Have you been told you have COPD, asthma, or any other lung disease?  No    Do you have any heart conditions?  No     Have you ever had an organ transplant?   No    Have you ever had or are you awaiting a heart or lung transplant?   No    Have you had a stroke or transient ischemic attack (TIA aka \"mini stroke\" in the last 6 months?   No    Have you been diagnosed with or been told you have cirrhosis of the liver?   No    Are you currently on dialysis?   No    Do you need assistance transferring?   No    BMI: Estimated body mass index is 27.53 kg/m  as calculated from the following:    Height as of 9/19/23: 1.549 m (5' 1\").    Weight as of 9/19/23: 66.1 kg (145 lb 11.2 oz).     Is patients BMI > 40 and scheduling location [facility]?  No    Do you take an injectable medication for weight loss or diabetes (excluding insulin)?  No    Do you take " the medication Naltrexone?  No    Do you take blood thinners?  No       Prep   Are you currently on dialysis or do you have chronic kidney disease?  No    Do you have a diagnosis of diabetes?  Yes (Golytely Prep)    Do you have a diagnosis of cystic fibrosis (CF)?  No    On a regular basis do you go 3 -5 days between bowel movements?  No    BMI > 40?  No    Preferred Pharmacy:    [place], [place] - [phone]  [phone]  [facility] [address]  Phone: [phone] Fax: [phone] Alternate Fax: [phone], [phone]      Final Scheduling Details   Colonoscopy prep sent?  Standard Golytely    Procedure scheduled  Colonoscopy    Surgeon:  Faith     Date of procedure:  11/02/2023     Pre-OP / PAC:   No - Not required for this site.    Location  [facility] - [facility] - Patient preference.    Sedation   Moderate Sedation - Per order.    CALL DAUGHTER FOR PRE ASSESSMENT CALL       Patient Reminders:   You will receive a call from a Nurse to review instructions and health history.  This assessment must be completed prior to your procedure.  Failure to complete the Nurse assessment may result in the procedure being cancelled.      On the day of your procedure, please designate an adult(s) who can drive you home stay with you for the next 24 hours. The medicines used in the exam will make you sleepy. You will not be able to drive.      You cannot take public transportation, ride share services, or non-medical taxi service without a responsible caregiver.  Medical transport services are allowed with the requirement that a responsible caregiver will receive you at your destination.  We require that drivers and caregivers are confirmed prior to your procedure.

## 2023-10-19 ENCOUNTER — TELEPHONE (OUTPATIENT)
Dept: GASTROENTEROLOGY | Facility: CLINIC | Age: 58
End: 2023-10-19
Payer: COMMERCIAL

## 2023-10-19 DIAGNOSIS — Z12.11 SPECIAL SCREENING FOR MALIGNANT NEOPLASMS, COLON: Primary | ICD-10-CM

## 2023-10-19 RX ORDER — BISACODYL 5 MG/1
TABLET, DELAYED RELEASE ORAL
Qty: 4 TABLET | Refills: 0 | Status: SHIPPED | OUTPATIENT
Start: 2023-10-19

## 2023-10-19 NOTE — LETTER
October 19, 2023      Elena Malik  [address]  [facility] [address]              Dear Elena,      Colonoscopy      Procedure date: 11/2/23    Anticipated arrival time: 1130 AM   (Procedure Times are Subject to Change)    [facility]; [address], [place] [address] - Check in location: 5th Floor. Parking information: Self pay parking is available in West surface lot directly across from the  main entrance of the [facility]. Entry and exit to this lot is on [address]. In  addition, self pay parking is also available in [address] ([address]).  We have dedicated patient only spots on 1st floor of [address] ramp.  services are available for patients with limited mobility. Services available Monday-Friday, 7:00a.m.-  5:00p.m.      Important Procedure Reminders:     Prep Instructions:   Instructions on how to prepare for your upcoming procedure are found below. Please read instructions carefully. Deviation from instructions may result in less than desired outcomes and procedure may need to be rescheduled. If you have additional questions regarding how to prepare for your upcoming procedure, please contact our endoscopy pre assessment nurses at [phone] option 4.      Policy:   On the day of your procedure, please designatean adult(s) who can drive you home stay with you for the next 24 hours. The medicines used in the exam will make you sleepy. You will not be able to drive. You cannot take public transportation, ride share services, or non-medical taxi service without a responsible caregiver.  Medical transport services are allowed with the requirement that a responsible caregiver will receive you at your destination.  We require that drivers and caregivers are confirmed prior to your procedure.    Day of procedure:  Please do not wear jewelry (i.e. earrings, rings, necklaces, watches, etc) . Leave your purse, billfold, credit cards,  and other valuables at home.   Bring insurance card and ID.     To cancel or reschedule your procedure:   Please call our endoscopy scheduling team at: [facility]: [phone], option 2. Monday through Friday, 7:00am-5:00pm.      Medication Reminders:    Please note the following medication holding recommendations:   Oral diabetic medication(s): Metformin (glucophage): HOLD day of procedure.    ----------------------------------------------------------------------------------------------------------------------------------------------------------------------------------------------------------------------------------------------------------------------      STANDARD Golytely (Colyte, Nulytely)  Prep Instructions for your Colonoscopy  Pre-Assessment Phone Number: [facility]; [phone] option 4      Please read these instructions carefully at least 7 days prior to your colonoscopy procedure. Be sure to follow all directions completely. The inside of your colon must be clean to allow for a complete examination for the presence of any growths, polyps, and/or abnormalities, as well as their biopsy or removal. A number of tips are included in order to make this part of the procedure as comfortable as possible.    Getting ready:   You will receive a call from a Nurse to review instructions and health history.  This assessment must be completed prior to your procedure.  Failure to complete the Nurse assessment phone call may result in the procedure being cancelled.  You must arrange for an adult to drive you home after your exam. Your colonoscopy cannot be done unless you have a ride. If you need to use public transportation, someone must ride with you and stay with you for a minimum of 6-24 hours.  Check with your insurance company to be sure they will cover this exam.    7 days before the exam:  Talk to your prescribing provider: If you take blood thinners (such as Coumadin,  Plavix, Xarelto, Eliquis, Lovenox, or others), these medications may need to be stopped temporarily before your procedure. Your prescribing provider will tell you what to do.   Talk to your prescribing provider: If you take prescription NSAIDS (such as Sulindac, Celebrex, Mobic, Relafen). Your prescribing provider will tell you what to do.   Stop taking fiber supplements (bran, Metamucil, Fibercon), multi-vitamins with iron, and medicines that contain iron.  Continue taking prescribed aspirin; talk to your prescribing doctor with any concerns.  Stop eating corn, nuts and foods with seeds.  These can stay in the colon for days.  If you have diabetes:  Ask to have your exam early in the morning.  Also, ask your doctor if you should change your diet or medicines.    3 days before the exam:  Begin a low-fiber diet: No raw fruits or vegetables, whole wheat, seeds, nuts, popcorn or other high-fiber foods (see list below). No Olestra (a fat substitute).    One day before the exam:  You can have a light, low-fiber breakfast. But drink only clear liquids after 9 a.m. (see list below). Drink at least 8 to 10 full glasses of clear liquids during the day.   Stop taking NSAID pain relievers, such as Advil, Ibuprofen, Motrin, etc.  You may take Tylenol.  Fill the jug that contains the Golytely powder with warm water. Cover and shake until well mixed. Use a full gallon of water. Chill for 3 hours, but do not add ice.  You will start drinking half of the Golytely solution at 6 p.m. The timing of drinking the 2nd half of the Golytely solution depends on your exam time. See Step 2 below.  After you start drinking the solution, stay near a toilet. You may have watery stools (diarrhea), mild cramping, bloating , and nausea.     Step One:  At 3 p.m., take 2 tablets of Dulcolax (bisacodyl).  At 6 p.m. start drinking the Golytely solution. Drink an 8-ounce glass every 15 minutes until the jug is half empty. Drink each glass quickly.      Step Two:   If you arrive before 11 AM:  At 11 p.m. on the day before your exam:  Take 2 Dulcolax (Bisacodyl) tablets.   Start drinking the other half of the Golytely jug. Drink one 8-ounce glass every 15 minutes until the jug is empty. Drink each glass quickly.  If you arrive after 11 AM:  At 6 AM on the day of the exam:  Take 2 tablets of Dulcolax (Bisacodyl).   Drink the other half of the Golytely jug.   Drink one 8-ounce glass every 15 minutes until the jug is empty.   Drink each glass quickly.   You should finish the prep 4 hours before the exam.      Day of exam:    You may take your necessary morning medications with sips of water  Do not take diabetes medicine by mouth until after your exam.  You may drink clear liquids only up until 2 hours before your arrival time.  Do not smoke, chew tobacco, or swallow anything, including water or gum for at least 2 hours before your arrival time. This is a safety issue. Your procedure could be cancelled if you do not follow directions.  Please do not wear jewelry (i.e. earrings, rings, necklaces, watches, etc) . Leave your purse, billfold, credit cards, and other valuables at home.   Please arrive with a responsible adult who can take you home after the test and stay with you for a minimum of 24 hours: The medicine used will make you sleepy and forgetful. If you do not have someone to take you home, we will cancel your procedure. If using public transportation you must have someone to ride with you.  Please perform your nebulizer treatments and airway clearance therapy in the morning prior to the procedure (if applicable).  If you have asthma, bring your inhalers.      CLEAR LIQUID DIET   You may have:  Water, tea, coffee (no milk or cream)  Soda pop, Gatorade (not red or purple)  Jell-O, Popsicles (no milk or fruit pieces - not red or purple)  Fat-free soup broth or bouillon  Plain hard candy, such as clear life savers (not red or purple)  Clear juices and  fruit-flavored drinks, such as apple juice, white grape juice, Hi-C, and Moris-Aid (not red or purple)   Do not have:  Milk or milk products such as ice cream, malts or shakes, or coffee creamer  Red or purple drinks of any kind such as cranberry juice or grape juice. Avoid red or purple Jell-O, Popsicles, Moris-Aid, sorbet, sherbet and candy  Juices with pulp such as orange, grapefruit, pineapple or tomato juice  Cream soups of any kind  Alcohol and beer  Protein drinks or protein powder     LOW FIBER DIET   You may have:    Starches: White bread, rolls, biscuits, croissants, [place] toast, white flour tortillas, waffles, pancakes, Turkish toast; white rice, noodles, pasta, macaroni; cooked and peeled potatoes; plain crackers, saltines; cooked farina or cream of rice; puffed rice, corn flakes, Rice Krispies, Special K   Vegetables: tender cooked and canned, vegetable broths  Fruits and fruit juices: Strained fruit juice, canned fruit without seeds or skin (not pineapple), applesauce, pear sauce, ripe bananas, melons (not watermelon)   Milk products: Milk (plain or flavored), cheese, cottage cheese, yogurt (no berries), custard, ice cream    Proteins: Tender, well-cooked ground beef, lamb, veal, ham, pork, chicken, turkey, fish or organ meats; eggs; creamy peanut butter   Fats and condiments:  Margarine, butter, oils, mayonnaise, sour cream, salad dressing, plain gravy; spices, cooked herbs; sugar, clear jelly, honey, syrup   Snacks, sweets and drinks: Pretzels, hard candy; plain cakes and cookies (no nuts or seeds); gelatin, plain pudding, sherbet, Popsicles; coffee, tea, carbonated ( fizzy ) drinks Do not have:    Starches: Breads or rolls that contain nuts, seeds or fruit; whole wheat or whole grain breads that contain more than 1 gram of fiber per slice; cornbread; corn or whole wheat tortillas; potatoes with skin; brown rice, wild rice, kasha (buckwheat), and oatmeal   Vegetables: Any raw or steamed vegetables;  vegetables with seeds; corn in any form   Fruits and fruit juices: Prunes, prune juice, raisins and other dried fruits, berries and other fruits with seeds, canned pineapple juices with pulp such as orange, grapefruit, pineapple or tomato juice  Milk products: Any yogurt with nuts, seeds or berries   Proteins: Tough, fibrous meats with gristle; cooked dried beans, peas or lentils; crunchy peanut butter  Fats and condiments: Pickles, olives, relish, horseradish; jam, marmalade, preserves   Snacks, sweets and drinks: Popcorn, nuts, seeds, granola, coconut, candies made with nuts or seeds; all desserts that contain nuts, seeds, raisins and other dried fruits, coconut, whole grains or bran.        FAQ:    How do you know if your colon is cleaned out?   After completing the bowel prep, your bowel movements should be all liquid and yellow. Your bowel movements will look similar to urine in the toilet. If there are pieces of stool (poop) in the toilet, or if you can't see to the bottom of the toilet, please call our office for advice. Call [phone] and ask to speak with a nurse.   Why do you need a responsible  to take you home and stay with you?  We require a responsible adult to take you home for your safety. The sedation medicines used to relax you during the procedure can impair your judgement and reaction time, make you forgetful and possible a little unsteady. Do not drive, make any important decisions, or sign any legal documents for 24 hours after your procedure.   It is normal to feel bloated and gassy after your procedure. Walking will help move the air through your colon. You can take non-aspirin pain relievers that contain acetaminophen (Tylenol).   When can you eat after your procedure?  You may resume your normal diet when you feel ready, unless advised otherwise by the doctor performing your procedure. Do not drink alcohol for 24 hours after your procedure.   You many resume normal activities  (work, exercise, etc.) after 24 hours.   When will you get test results?  You should have your procedure results and any lab results (if applicable) by letter, MyChart message, or phone call within 2 weeks. If you have any questions, please call the doctor that referred you for the procedure.       Thank you for choosing  [facility], for your procedure. If you are sent a survey regarding your care, please take the time to complete the questionnaire. We value your feedback!             Updated: 6/22/2022

## 2023-10-19 NOTE — TELEPHONE ENCOUNTER
Pre assessment completed for upcoming procedure.    Via [facility]     Procedure details:    Patient scheduled for Colonoscopy  on 11/2/23.     Arrival time: 1130. Procedure time 1230    Pre op exam needed? N/A    [facility]; [address] [place] [address], 5th Floor, [place] [address]    Sedation type: Conscious sedation     Indication for procedure: screening    COVID policy reviewed.    Designated  policy reviewed. Instructed to have someone stay 6 hours post procedure.       Chart review:     Electronic implanted devices? No    Diabetic? Yes. See medication holding recommendations     Diabetic medication HOLDING recommendations: (if applicable)  Oral diabetic medications: Yes:  Metformin (glucophage): HOLD day of procedure.  Diabetic injectables: No  Insulin: No    Medication review:    Anticoagulants? No    NSAIDS? No    Other medication HOLDING recommendations:  N/A      Prep for procedure:     Bowel prep recommendation: Standard Golytely   Due to: diabetes.     Prep instructions sent via letter     Bowel prep script sent to   [place], [place] - [address] [place] [address] [phone]    Reviewed procedure prep instructions.     Patient verbalized understanding and had no questions or concerns at this time.        Tiffanie Olmstead RN  Endoscopy Procedure Pre Assessment RN  [phone] option 4

## 2023-10-19 NOTE — TELEPHONE ENCOUNTER
Pre visit planning completed.      Procedure details:    Patient scheduled for Colonoscopy  on 11/2/23.     Arrival time: 1130. Procedure time 1230    Pre op exam needed? N/A    [facility]; [address] [place] [address], 5th Floor, [place] [address]    Sedation type: Conscious sedation     Indication for procedure: Screening      Chart review:     Electronic implanted devices? No    Diabetic? Yes. See medication holding recommendations     Diabetic medication HOLDING recommendations: (if applicable)  Oral diabetic medications: Yes:  Metformin (glucophage): HOLD day of procedure.  Diabetic injectables: No  Insulin: No      Medication review:    Anticoagulants? No    NSAIDS? No    Other medication HOLDING recommendations:  N/A      Prep for procedure:     Bowel prep recommendation: Standard Golytely    Due to:  diabetes.     Prep instructions sent via letter     Bowel prep script sent to    [place], [place] - [address] [place] [address] [phone]        Tiffanie Olmstead RN  Endoscopy Procedure Pre Assessment RN  [phone] option 4

## 2023-10-28 RX ORDER — LIDOCAINE 40 MG/G
CREAM TOPICAL
Status: CANCELLED | OUTPATIENT
Start: 2023-10-28

## 2023-10-28 RX ORDER — ONDANSETRON 2 MG/ML
4 INJECTION INTRAMUSCULAR; INTRAVENOUS
Status: CANCELLED | OUTPATIENT
Start: 2023-10-28

## 2023-11-02 RX ORDER — SODIUM CHLORIDE, SODIUM LACTATE, POTASSIUM CHLORIDE, CALCIUM CHLORIDE 600; 310; 30; 20 MG/100ML; MG/100ML; MG/100ML; MG/100ML
INJECTION, SOLUTION INTRAVENOUS CONTINUOUS
Status: CANCELLED | OUTPATIENT
Start: 2023-11-02

## 2023-11-20 ENCOUNTER — OFFICE VISIT (OUTPATIENT)
Dept: OPHTHALMOLOGY | Facility: CLINIC | Age: 58
End: 2023-11-20
Attending: STUDENT IN AN ORGANIZED HEALTH CARE EDUCATION/TRAINING PROGRAM
Payer: COMMERCIAL

## 2023-11-20 DIAGNOSIS — H04.123 DRY EYES, BILATERAL: Primary | ICD-10-CM

## 2023-11-20 DIAGNOSIS — H52.03 HYPEROPIA OF BOTH EYES WITH ASTIGMATISM AND PRESBYOPIA: ICD-10-CM

## 2023-11-20 DIAGNOSIS — E11.9 DIABETES MELLITUS TYPE 2 WITHOUT RETINOPATHY (H): ICD-10-CM

## 2023-11-20 DIAGNOSIS — H52.4 HYPEROPIA OF BOTH EYES WITH ASTIGMATISM AND PRESBYOPIA: ICD-10-CM

## 2023-11-20 DIAGNOSIS — H52.203 HYPEROPIA OF BOTH EYES WITH ASTIGMATISM AND PRESBYOPIA: ICD-10-CM

## 2023-11-20 PROCEDURE — 92004 COMPRE OPH EXAM NEW PT 1/>: CPT | Performed by: STUDENT IN AN ORGANIZED HEALTH CARE EDUCATION/TRAINING PROGRAM

## 2023-11-20 PROCEDURE — 99214 OFFICE O/P EST MOD 30 MIN: CPT | Performed by: STUDENT IN AN ORGANIZED HEALTH CARE EDUCATION/TRAINING PROGRAM

## 2023-11-20 PROCEDURE — 92015 DETERMINE REFRACTIVE STATE: CPT

## 2023-11-20 ASSESSMENT — REFRACTION_MANIFEST
OD_CYLINDER: +1.75
OD_AXIS: 172
OD_SPHERE: +0.25
OD_ADD: +2.50
OS_ADD: +2.50
OS_SPHERE: +0.75
OS_CYLINDER: SPHERE

## 2023-11-20 ASSESSMENT — TONOMETRY
OS_IOP_MMHG: 12
IOP_METHOD: TONOPEN
OD_IOP_MMHG: 12

## 2023-11-20 ASSESSMENT — CONF VISUAL FIELD
OD_INFERIOR_NASAL_RESTRICTION: 0
OD_SUPERIOR_NASAL_RESTRICTION: 0
OS_NORMAL: 1
OD_NORMAL: 1
OS_INFERIOR_TEMPORAL_RESTRICTION: 0
OS_SUPERIOR_TEMPORAL_RESTRICTION: 0
OD_INFERIOR_TEMPORAL_RESTRICTION: 0
OS_INFERIOR_NASAL_RESTRICTION: 0
OD_SUPERIOR_TEMPORAL_RESTRICTION: 0
OS_SUPERIOR_NASAL_RESTRICTION: 0

## 2023-11-20 ASSESSMENT — CUP TO DISC RATIO
OS_RATIO: 0.25
OD_RATIO: 0.25

## 2023-11-20 ASSESSMENT — VISUAL ACUITY
OD_SC: 20/40
OS_SC+: +2
OD_SC+: -2
OS_SC: 20/40
METHOD: SNELLEN - LINEAR

## 2023-11-20 ASSESSMENT — EXTERNAL EXAM - RIGHT EYE: OD_EXAM: WNL

## 2023-11-20 ASSESSMENT — EXTERNAL EXAM - LEFT EYE: OS_EXAM: WNL

## 2023-11-20 NOTE — PATIENT INSTRUCTIONS
Use preservative artificial tears one drop four times a day and as needed for comfort to both eyes; some brands include: refresh, systane, thera tears, blink, etc    DO NOT use eye drops that say 'take the red out' including Visine or Clear Eyes    Do warm compresses at least two times a day to both eyelids for 5-10 min each time    
[id number]

## 2023-11-20 NOTE — PROGRESS NOTES
HPI       Annual Eye Exam    In both eyes.  This started 1 year ago.             Comments    Pt. States that she has been having some pain BE for the last few months. Pain is sharp and comes and goes throughout the day. Seems to be worse in the afternoon. Does have frequent tearing and discharge. Has been using Olopatadine BE with no improvement in symptoms. No problems with vision BE. No flashes or floaters BE.   Ying Carmichael COT 8:43 AM November 20, 2023             Last edited by Ying Carmichael on 11/20/2023  9:02 AM.          Review of systems for the eyes was negative other than the pertinent positives/negatives listed in the HPI.    Ocular Meds: olopatadine prn OU    Ocular Hx: dry eyes OU    FOHx: no family history of glaucoma or blindness    PMHx:   Past Medical History:   Diagnosis Date    Diabetes (H)        Assessment & Plan      Elena Malik is a 57 year old female with the following diagnoses:    Dry Eyes OU  - ATs QID and prn OU  - warm compresses BID OU x 5-10 min each time    T2DM without Retinopathy OU  - strict BP/BG control  - patient understands importance  of good blood glucose control in order to reduce the risk of developing diabetic retinopathy  - yearly DFE    Hyperopia of both eyes with astigmatism and presbyopia  - refraction reviewed and dispensed today with excellent BCVA    Counseled return/RD precautions    Patient disposition:   Return in about 1 year (around 11/20/2024) for Annual Visit, or sooner changes.    Attending Physician Attestation:  Complete documentation of historical and exam elements from today's encounter can be found in the full encounter summary report (not reduplicated in this progress note).  I personally obtained the chief complaint(s) and history of present illness.  I confirmed and edited as necessary the review of systems, past medical/surgical history, family history, social history, and examination findings as documented by others; and I examined the patient  myself.  I personally reviewed the relevant tests, images, and reports as documented above.  I formulated and edited as necessary the assessment and plan and discussed the findings and management plan with the patient and family. . - Kary Morel MD

## 2023-11-20 NOTE — NURSING NOTE
Chief Complaints and History of Present Illnesses   Patient presents with    Annual Eye Exam     Chief Complaint(s) and History of Present Illness(es)       Annual Eye Exam              Laterality: both eyes    Onset: 1 year ago              Comments    Pt. States that she has been having some pain BE for the last few months. Pain is sharp and comes and goes throughout the day. Seems to be worse in the afternoon. Does have frequent tearing and discharge. Has been using Olopatadine BE with no improvement in symptoms. No problems with vision BE. No flashes or floaters BE.   Ying Carmichael COT 8:43 AM November 20, 2023

## 2023-11-20 NOTE — LETTER
11/20/2023       RE: Elena Malik  [address] [facility] [id number]     Dear Colleague,    Thank you for referring your patient, Elena Malik, to the [facility] [place] at [facility]. Please see a copy of my visit note below.    HPI       Annual Eye Exam    In both eyes.  This started 1 year ago.             Comments    Pt. States that she has been having some pain BE for the last few months. Pain is sharp and comes and goes throughout the day. Seems to be worse in the afternoon. Does have frequent tearing and discharge. Has been using Olopatadine BE with no improvement in symptoms. No problems with vision BE. No flashes or floaters BE.   Ying Carmichael COT 8:43 AM November 20, 2023             Last edited by Ying Carmichael on 11/20/2023  9:02 AM.          Review of systems for the eyes was negative other than the pertinent positives/negatives listed in the HPI.    Ocular Meds: olopatadine prn OU    Ocular Hx: dry eyes OU    FOHx: no family history of glaucoma or blindness    PMHx:   Past Medical History:   Diagnosis Date     Diabetes (H)        Assessment & Plan      Elena Malik is a 57 year old female with the following diagnoses:    Dry Eyes OU  - ATs QID and prn OU  - warm compresses BID OU x 5-10 min each time    T2DM without Retinopathy OU  - strict BP/BG control  - patient understands importance  of good blood glucose control in order to reduce the risk of developing diabetic retinopathy  - yearly DFE    Hyperopia of both eyes with astigmatism and presbyopia  - refraction reviewed and dispensed today with excellent BCVA    Counseled return/RD precautions    Patient disposition:   Return in about 1 year (around 11/20/2024) for Annual Visit, or sooner changes.    Attending Physician Attestation:  Complete documentation of historical and exam elements from today's encounter can be found in the full encounter summary report (not  reduplicated in this progress note).  I personally obtained the chief complaint(s) and history of present illness.  I confirmed and edited as necessary the review of systems, past medical/surgical history, family history, social history, and examination findings as documented by others; and I examined the patient myself.  I personally reviewed the relevant tests, images, and reports as documented above.  I formulated and edited as necessary the assessment and plan and discussed the findings and management plan with the patient and family. . - Kary Morel MD        Again, thank you for allowing me to participate in the care of your patient.      Sincerely,    Kary Morel MD

## 2023-11-23 ASSESSMENT — SLIT LAMP EXAM - LIDS
COMMENTS: NORMAL
COMMENTS: NORMAL

## 2023-12-30 ENCOUNTER — HEALTH MAINTENANCE LETTER (OUTPATIENT)
Age: 58
End: 2023-12-30

## 2024-04-25 ENCOUNTER — TELEPHONE (OUTPATIENT)
Dept: GASTROENTEROLOGY | Facility: CLINIC | Age: 59
End: 2024-04-25
Payer: COMMERCIAL

## 2024-04-25 NOTE — TELEPHONE ENCOUNTER
"Endoscopy Scheduling Screen    Have you had a positive Covid test in the last 14 days?  No    What is your communication preference for Instructions and/or Bowel Prep?   MyChart    What insurance is in the chart?  Other:  [facility]    Ordering/Referring Provider: Merlyn Shelby NP   (If ordering provider performs procedure, schedule with ordering provider unless otherwise instructed. )    BMI: Estimated body mass index is 27.53 kg/m  as calculated from the following:    Height as of 9/19/23: 1.549 m (5' 1\").    Weight as of 9/19/23: 66.1 kg (145 lb 11.2 oz).     Sedation Ordered  moderate sedation.   If patient BMI > 50 do not schedule in [facility].    If patient BMI > 45 do not schedule at [facility].    Are you taking methadone or Suboxone?  No    Have you had difficulties, pain, or discomfort during past endoscopy procedures?  No1st    Are you taking any prescription medications for pain 3 or more times per week?   NO, No RN review required.    Do you have a history of malignant hyperthermia?  No    (Females) Are you currently pregnant?   No     Have you been diagnosed or told you have pulmonary hypertension?   No    Do you have an LVAD?  No    Have you been told you have moderate to severe sleep apnea?  No    Have you been told you have COPD, asthma, or any other lung disease?  No    Do you have any heart conditions?  No     Have you ever had or are you waiting for an organ transplant?  No. Continue scheduling, no site restrictions.    Have you had a stroke or transient ischemic attack (TIA aka \"mini stroke\" in the last 6 months?   No    Have you been diagnosed with or been told you have cirrhosis of the liver?   No    Are you currently on dialysis?   No    Do you need assistance transferring?   No    BMI: Estimated body mass index is 27.53 kg/m  as calculated from the following:    Height as of 9/19/23: 1.549 m (5' 1\").    Weight as of 9/19/23: 66.1 kg (145 lb 11.2 oz).     Is patients BMI > 40 and scheduling location " [facility]?  No    Do you take an injectable medication for weight loss or diabetes (excluding insulin)?  No    Do you take the medication Naltrexone?  No    Do you take blood thinners?  No       Prep   Are you currently on dialysis or do you have chronic kidney disease?  No    Do you have a diagnosis of diabetes?  Yes (Golytely Prep)    Do you have a diagnosis of cystic fibrosis (CF)?  No    On a regular basis do you go 3 -5 days between bowel movements?  No    BMI > 40?  No    Preferred Pharmacy:    [facility] [phone]  [address] [facility] [phone]  [facility] [address]  Phone: [phone] Fax: [phone] Alternate Fax: [phone], [phone]      Final Scheduling Details     Procedure scheduled  Colonoscopy    Surgeon:  Joni     Date of procedure:  05/06/2024     Pre-OP / PAC:   No - Not required for this site.    Location  [facility] - Patient preference.    Sedation   Moderate Sedation - Per order.      CALL PHAM COLES FOR PRE ASSESSMENT CALL       Patient Reminders:   You will receive a call from a Nurse to review instructions and health history.  This assessment must be completed prior to your procedure.  Failure to complete the Nurse assessment may result in the procedure being cancelled.      On the day of your procedure, please designate an adult(s) who can drive you home stay with you for the next 24 hours. The medicines used in the exam will make you sleepy. You will not be able to drive.      You cannot take public transportation, ride share services, or non-medical taxi service without a responsible caregiver.  Medical transport services are allowed with the requirement that a responsible caregiver will receive you at your destination.  We require that drivers and caregivers are confirmed prior to your procedure.

## 2024-04-26 NOTE — TELEPHONE ENCOUNTER
Attempted to call the Pt via [facility] . No answer on  line for 10 minute hold. Will need to retry.     Rescheduled procedure    Patient scheduled for Colonoscopy  on 5/6/24.    Arrival time: 1000. Procedure time 1045    Pre op exam needed? N/A    Facility location: [facility] [place]; [address], [place] [address]    Sedation type: Conscious sedation     Diabetic medication HOLDING recommendations: (if applicable)  Oral diabetic medications: Yes:  Metformin (glucophage): HOLD day of procedure.  Diabetic injectables: No  Insulin: No    Constipation noted on problems list, discuss with the Pt. May need extended Golytely. Standard Golytely previously sent. Will hold off on Mychart instructions until we verify bowel habits.         Viji Beard RN  Endoscopy Procedure Pre Assessment RN

## 2024-04-29 RX ORDER — BISACODYL 5 MG/1
TABLET, DELAYED RELEASE ORAL
Qty: 4 TABLET | Refills: 0 | Status: SHIPPED | OUTPATIENT
Start: 2024-04-29

## 2024-04-29 NOTE — TELEPHONE ENCOUNTER
Pre assessment completed for upcoming procedure.   (Please see previous telephone encounter notes for complete details) via [facility] .       Procedure details:    Arrival time and facility location reviewed.    Pre op exam needed? N/A    Designated  policy reviewed. Instructed to have someone stay 6  hours post procedure.       Medication review:    Medications reviewed. Please see supporting documentation below. Holding recommendations discussed (if applicable).       Prep for procedure:     Procedure prep instructions reviewed.  The Pt denies constipation. Will stick with standard Golytely. Resent script for the Pt.       Any additional information needed:  N/A      Patient  verbalized understanding and had no questions or concerns at this time.      Viji Beard RN  Endoscopy Procedure Pre Assessment RN  [phone] option 4

## 2024-04-30 ENCOUNTER — LAB (OUTPATIENT)
Dept: LAB | Facility: CLINIC | Age: 59
End: 2024-04-30
Payer: COMMERCIAL

## 2024-04-30 ENCOUNTER — OFFICE VISIT (OUTPATIENT)
Dept: INTERNAL MEDICINE | Facility: CLINIC | Age: 59
End: 2024-04-30
Payer: COMMERCIAL

## 2024-04-30 VITALS
DIASTOLIC BLOOD PRESSURE: 76 MMHG | OXYGEN SATURATION: 99 % | WEIGHT: 144.7 LBS | SYSTOLIC BLOOD PRESSURE: 130 MMHG | HEART RATE: 56 BPM | BODY MASS INDEX: 27.34 KG/M2

## 2024-04-30 DIAGNOSIS — R31.0 GROSS HEMATURIA: ICD-10-CM

## 2024-04-30 DIAGNOSIS — E78.5 DYSLIPIDEMIA: ICD-10-CM

## 2024-04-30 DIAGNOSIS — Z00.00 ENCOUNTER FOR PREVENTIVE CARE: ICD-10-CM

## 2024-04-30 DIAGNOSIS — E11.9 DIABETES MELLITUS, TYPE II (H): ICD-10-CM

## 2024-04-30 DIAGNOSIS — E11.9 TYPE 2 DIABETES MELLITUS WITHOUT COMPLICATION, WITHOUT LONG-TERM CURRENT USE OF INSULIN (H): ICD-10-CM

## 2024-04-30 DIAGNOSIS — E11.9 TYPE 2 DIABETES MELLITUS WITHOUT COMPLICATION, WITHOUT LONG-TERM CURRENT USE OF INSULIN (H): Primary | ICD-10-CM

## 2024-04-30 LAB
ALBUMIN SERPL BCG-MCNC: 4.2 G/DL (ref 3.5–5.2)
ALBUMIN UR-MCNC: NEGATIVE MG/DL
ALP SERPL-CCNC: 101 U/L (ref 40–150)
ALT SERPL W P-5'-P-CCNC: 12 U/L (ref 0–50)
ANION GAP SERPL CALCULATED.3IONS-SCNC: 10 MMOL/L (ref 7–15)
APPEARANCE UR: CLEAR
AST SERPL W P-5'-P-CCNC: 19 U/L (ref 0–45)
BILIRUB SERPL-MCNC: 0.2 MG/DL
BILIRUB UR QL STRIP: NEGATIVE
BUN SERPL-MCNC: 11.4 MG/DL (ref 6–20)
CALCIUM SERPL-MCNC: 9.2 MG/DL (ref 8.6–10)
CHLORIDE SERPL-SCNC: 106 MMOL/L (ref 98–107)
CHOLEST SERPL-MCNC: 124 MG/DL
COLOR UR AUTO: NORMAL
CREAT SERPL-MCNC: 0.51 MG/DL (ref 0.51–0.95)
DEPRECATED HCO3 PLAS-SCNC: 22 MMOL/L (ref 22–29)
EGFRCR SERPLBLD CKD-EPI 2021: >90 ML/MIN/1.73M2
FASTING STATUS PATIENT QL REPORTED: ABNORMAL
GLUCOSE SERPL-MCNC: 101 MG/DL (ref 70–99)
GLUCOSE UR STRIP-MCNC: NEGATIVE MG/DL
HBA1C MFR BLD: 7 %
HDLC SERPL-MCNC: 49 MG/DL
HGB UR QL STRIP: NEGATIVE
KETONES UR STRIP-MCNC: NEGATIVE MG/DL
LDLC SERPL CALC-MCNC: 49 MG/DL
LEUKOCYTE ESTERASE UR QL STRIP: NEGATIVE
NITRATE UR QL: NEGATIVE
NONHDLC SERPL-MCNC: 75 MG/DL
PH UR STRIP: 5 [PH] (ref 5–7)
POTASSIUM SERPL-SCNC: 4.1 MMOL/L (ref 3.4–5.3)
PROT SERPL-MCNC: 7 G/DL (ref 6.4–8.3)
RBC URINE: 1 /HPF
SODIUM SERPL-SCNC: 138 MMOL/L (ref 135–145)
SP GR UR STRIP: 1.01 (ref 1–1.03)
SQUAMOUS EPITHELIAL: <1 /HPF
TRIGL SERPL-MCNC: 128 MG/DL
UROBILINOGEN UR STRIP-MCNC: NORMAL MG/DL
WBC URINE: 1 /HPF

## 2024-04-30 PROCEDURE — 80053 COMPREHEN METABOLIC PANEL: CPT | Performed by: PATHOLOGY

## 2024-04-30 PROCEDURE — 81001 URINALYSIS AUTO W/SCOPE: CPT | Performed by: PATHOLOGY

## 2024-04-30 PROCEDURE — 99214 OFFICE O/P EST MOD 30 MIN: CPT | Performed by: NURSE PRACTITIONER

## 2024-04-30 PROCEDURE — 99000 SPECIMEN HANDLING OFFICE-LAB: CPT | Performed by: PATHOLOGY

## 2024-04-30 PROCEDURE — 82043 UR ALBUMIN QUANTITATIVE: CPT | Performed by: NURSE PRACTITIONER

## 2024-04-30 PROCEDURE — 83036 HEMOGLOBIN GLYCOSYLATED A1C: CPT | Performed by: NURSE PRACTITIONER

## 2024-04-30 PROCEDURE — 80061 LIPID PANEL: CPT | Performed by: PATHOLOGY

## 2024-04-30 PROCEDURE — 36415 COLL VENOUS BLD VENIPUNCTURE: CPT | Performed by: PATHOLOGY

## 2024-04-30 NOTE — PROGRESS NOTES
Steven Parker is a 58 year old, presenting for the following health issues:  Hematuria (Blood in urine 2 weeks ago.)      4/30/2024     5:19 PM   Additional Questions   Roomed by KTR   Accompanied by Melissa (Daughter)     Via the Health Maintenance questionnaire, the patient has reported the following services have been completed -Colonscopy, this information has been sent to the abstraction team.  History of Present Illness       Reason for visit:  Hematuria  Symptom onset:  1-2 weeks ago  Symptoms include:  Nausea  Symptom intensity:  Severe  Symptom progression:  Improving  Had these symptoms before:  No    She eats 4 or more servings of fruits and vegetables daily.She consumes 0 sweetened beverage(s) daily.She exercises with enough effort to increase her heart rate 9 or less minutes per day.  She exercises with enough effort to increase her heart rate 3 or less days per week.   She is taking medications regularly.       Objective    /76 (BP Location: Right arm, Patient Position: Sitting, Cuff Size: Adult Regular)   Pulse 56   Wt 65.6 kg (144 lb 11.2 oz)   SpO2 99%   BMI 27.34 kg/m    Body mass index is 27.34 kg/m .      Signed Electronically by: Merlyn Shelby NP

## 2024-04-30 NOTE — PROGRESS NOTES
Assessment and Plan    Ms. Malik is a 58 year old female with history of T2DM, dyslipidemia who presents for evaluation of hematuria.    (R31.0) Gross hematuria  Comment:  Without symptoms of dysuria.  No further visible blood in the urine.   Plan: UA with Microscopic reflex to Culture - lab collect    (E11.9) Diabetes mellitus, type II (H)  (primary encounter diagnosis)  Comment:  Metformin recently increased to 1000 mg BID  Plan: Albumin Random Urine Quantitative with Creat       Ratio,   HEMOGLOBIN A1C,   Comprehensive metabolic panel (BMP + Alb, Alk Phos, ALT, AST, Total. Bili, TP)          (Z00.00) Encounter for preventive care  Comment:  No breast concerns  Plan: *MA Screening Digital Bilateral               Health Maintenance:  Immunizations:  Declines vaccines today  COVID-19:  Booster due  Influenza:  Up to date  Zoster:  Never done  Tdap: Never done  PCV13->23:  Never done     Most Recent Immunizations   Administered Date(s) Administered    COVID-19 MONOVALENT 12+ (Pfizer) 01/11/2021     HTN:  Normotensive today  Lipids:   Recent Labs   Lab Test 09/20/23  0917   CHOL 185   HDL 51   *   TRIG 123     DM:   Lab Results   Component Value Date    A1C 7.5 09/20/2023     Pap/Pelvic (21-65 yrs): due Jan 2025  Mammogram (40-75 yrs):  ordered today  Colonoscopy (50-75 yrs):  scheduled for next week      Return to clinic/Follow-up:  As needed and with no improvement, worsening, or new symptom development.     Options for treatment and follow-up care were reviewed with the patient. She engaged in the decision making process and verbalized understanding of the options discussed and agreed with the final plan.    I spent a total of 30 minutes in the care of this pt today, including time prior to, during, and following today's office visit. This time includes reviewing the patient's chart and prior history, external notes, obtaining a history, performing an examination, interpreting test results, and evaluation and  counseling the patient. This time also includes ordering medications or tests necessary in addition to communication to other member's of the patient's health care team. Time spent in documentation and care coordination is included.    Merlyn Shelby, ANP, AGACNP, APRN, CNP  Nurse Practitioner      __________________________    Subjective   Presenting Concern:  Ms. Malik is a 58 year old female with history of T2DM, dyslipidemia who presents for evaluation of hematuria.  She is accompanied by her daughter who is interpreting.    Urinary symptoms:  Blood in urine starting two weeks ago over a couple a days, streaks on the paper with wiping. No dysuria, no odor. Symptoms now pretty much resolved.   Nauseated for past several days. No vomiting.   Normal bowel movements, no visible blood.  No vaginal bleeding.  LMP 14 years ago.       T2DM:  Seen for diabetes care at external clinic (notes/labs not available).  Metformin was increased to 1000 mg BID based on POC BG.        Elevated BP:  History of elevated BPs.  Normotensive today.  Denies chest pressure, palpitations, headache      Past Medical History:  Past Medical History:   Diagnosis Date    Diabetes (H)        Active Meds:  Current Outpatient Medications   Medication Sig Dispense Refill    atorvastatin (LIPITOR) 20 MG tablet Take 1 tablet (20 mg) by mouth daily 90 tablet 3    bisacodyl (DULCOLAX) 5 MG EC tablet Take 2 tablets at 3 pm the day before your procedure. If your procedure is before 11 am, take 2 additional tablets at 11 pm. If your procedure is after 11 am, take 2 additional tablets at 6 am. For additional instructions refer to your colonoscopy prep instructions. 4 tablet 0    bisacodyl (DULCOLAX) 5 MG EC tablet Take 2 tablets at 3 pm the day before your procedure. If your procedure is before 11 am, take 2 additional tablets at 11 pm. If your procedure is after 11 am, take 2 additional tablets at 6 am. For additional instructions refer to your  colonoscopy prep instructions. 4 tablet 0    blood glucose monitoring (NO BRAND SPECIFIED) meter device kit Use to test blood sugar once times daily or as directed. 1 kit 11    calcium citrate-vitamin D (CITRACAL) 200-6.25 MG-MCG TABS per tablet Take 1 tablet by mouth 2 times daily      loperamide (IMODIUM A-D) 2 MG tablet Take 1 tablet (2 mg) by mouth 4 times daily as needed for diarrhea (Patient not taking: Reported on 9/19/2023) 20 tablet 0    meclizine (ANTIVERT) 12.5 MG tablet Take 2 tablets (25 mg) by mouth 3 times daily as needed for dizziness 30 tablet 0    metFORMIN (GLUCOPHAGE) 850 MG tablet Take 1 tablet (850 mg) by mouth 2 times daily (with meals) for 120 days 180 tablet 1    ondansetron (ZOFRAN) 4 MG tablet Take 1 tablet (4 mg) by mouth every 6 hours as needed for nausea or vomiting (Patient not taking: Reported on 9/19/2023) 18 tablet 0    polyethylene glycol (GOLYTELY) 236 g suspension The night before the exam at 6 pm drink an 8-ounce glass every 15 minutes until the jug is half empty. If you arrive before 11 AM: Drink the other half of the Golytely jug at 11 PM night before procedure. If you arrive after 11 AM: Drink the other half of the Golytely jug at 6 AM day of procedure. For additional instructions refer to your colonoscopy prep instructions. 4000 mL 0    polyethylene glycol (GOLYTELY) 236 g suspension The night before the exam at 6 pm drink an 8-ounce glass every 15 minutes until the jug is half empty. If you arrive before 11 AM: Drink the other half of the Golytely jug at 11 PM night before procedure. If you arrive after 11 AM: Drink the other half of the Golytely jug at 6 AM day of procedure. For additional instructions refer to your colonoscopy prep instructions. 4000 mL 0    scopolamine (TRANSDERM) 1 MG/3DAYS 72 hr patch Place 1 patch onto the skin every 72 hours (Patient not taking: Reported on 9/19/2023) 3 patch 0     No current facility-administered medications for this visit.         Allergies:  Reviewed, refer to EMR    A full 10-pt Review of Systems was performed, verified and is negative except as documented in the HPI.  All health questionnaires were reviewed, verified and relevant information documented above.    Objective      /76 (BP Location: Right arm, Patient Position: Sitting, Cuff Size: Adult Regular)   Pulse 56   Wt 65.6 kg (144 lb 11.2 oz)   SpO2 99%   BMI 27.34 kg/m      BP Readings from Last 6 Encounters:   04/30/24 130/76   09/19/23 (!) 143/67   10/08/22 (!) 140/66   08/23/22 128/69   07/03/22 134/64   11/08/21 (!) 143/62       Physical Exam   General appearance: alert, well appearing, and in no distress  Eyes: extra-ocular movements intact, pupils equally round and reactive bilaterally, no scleral icterus  Neck: no significant adenopathy, lymphadenopathy, JVD  Respiratory: Good air movement bilaterally, lungs clear, no wheezes/rales/rhonchi, symmetric air entry and excursion  Cardiovascular: normal rate and regular rhythm, S1 and S2 normal, no murmurs, rubs or gallops, peripheral pulses full/symmetric, no peripheral edema  GI: Abdomen soft, bowel sounds present, nondistended, nontender, no organomegaly or masses, no rebound/guarding  Musculoskeletal: No obvious joint deformity. Normal muscle tone, normal gait  Neurological: alert, oriented, cranial nerves II through XII grossly intact, sensation to light touch intact  Skin: normal coloration and turgor.   Psychiatric: normal mentation, affect and mood    Answers submitted by the patient for this visit:  General Questionnaire (Submitted on 4/30/2024)  Chief Complaint: Chronic problems general questions HPI Form  How many servings of fruits and vegetables do you eat daily?: 4 or more  On average, how many sweetened beverages do you drink each day (Examples: soda, juice, sweet tea, etc.  Do NOT count diet or artificially sweetened beverages)?: 0  How many minutes a day do you exercise enough to make your heart beat  faster?: 9 or less  How many days a week do you exercise enough to make your heart beat faster?: 3 or less  How many days per week do you miss taking your medication?: 0  General Concern (Submitted on 4/30/2024)  Chief Complaint: Chronic problems general questions HPI Form  What is the reason for your visit today?: Hematuria  When did your symptoms begin?: 1-2 weeks ago  What are your symptoms?: nausea  How would you describe these symptoms?: Severe  Are your symptoms:: Improving  Have you had these symptoms before?: No

## 2024-05-01 LAB
CREAT UR-MCNC: 40.6 MG/DL
MICROALBUMIN UR-MCNC: <12 MG/L
MICROALBUMIN/CREAT UR: NORMAL MG/G{CREAT}

## 2024-05-06 ENCOUNTER — HOSPITAL ENCOUNTER (OUTPATIENT)
Facility: AMBULATORY SURGERY CENTER | Age: 59
Discharge: HOME | End: 2024-05-06
Attending: STUDENT IN AN ORGANIZED HEALTH CARE EDUCATION/TRAINING PROGRAM | Admitting: STUDENT IN AN ORGANIZED HEALTH CARE EDUCATION/TRAINING PROGRAM
Payer: COMMERCIAL

## 2024-05-06 VITALS
DIASTOLIC BLOOD PRESSURE: 61 MMHG | SYSTOLIC BLOOD PRESSURE: 98 MMHG | OXYGEN SATURATION: 97 % | HEART RATE: 48 BPM | RESPIRATION RATE: 16 BRPM | TEMPERATURE: 98.5 F

## 2024-05-06 LAB — COLONOSCOPY: NORMAL

## 2024-05-06 PROCEDURE — G8918 PT W/O PREOP ORDER IV AB PRO: HCPCS

## 2024-05-06 PROCEDURE — 45378 DIAGNOSTIC COLONOSCOPY: CPT

## 2024-05-06 PROCEDURE — G8907 PT DOC NO EVENTS ON DISCHARG: HCPCS

## 2024-05-06 RX ORDER — LIDOCAINE 40 MG/G
CREAM TOPICAL
Status: DISCONTINUED | OUTPATIENT
Start: 2024-05-06 | End: 2024-05-07 | Stop reason: HOSPADM

## 2024-05-06 RX ORDER — DIPHENHYDRAMINE HYDROCHLORIDE 50 MG/ML
25-50 INJECTION INTRAMUSCULAR; INTRAVENOUS
Status: DISCONTINUED | OUTPATIENT
Start: 2024-05-06 | End: 2024-05-07 | Stop reason: HOSPADM

## 2024-05-06 RX ORDER — EPINEPHRINE 1 MG/ML
0.1 INJECTION, SOLUTION INTRAMUSCULAR; SUBCUTANEOUS
Status: DISCONTINUED | OUTPATIENT
Start: 2024-05-06 | End: 2024-05-07 | Stop reason: HOSPADM

## 2024-05-06 RX ORDER — NALOXONE HYDROCHLORIDE 0.4 MG/ML
0.4 INJECTION, SOLUTION INTRAMUSCULAR; INTRAVENOUS; SUBCUTANEOUS
Status: DISCONTINUED | OUTPATIENT
Start: 2024-05-06 | End: 2024-05-07 | Stop reason: HOSPADM

## 2024-05-06 RX ORDER — ATROPINE SULFATE 0.1 MG/ML
1 INJECTION INTRAVENOUS
Status: DISCONTINUED | OUTPATIENT
Start: 2024-05-06 | End: 2024-05-07 | Stop reason: HOSPADM

## 2024-05-06 RX ORDER — SIMETHICONE 40MG/0.6ML
133 SUSPENSION, DROPS(FINAL DOSAGE FORM)(ML) ORAL
Status: DISCONTINUED | OUTPATIENT
Start: 2024-05-06 | End: 2024-05-07 | Stop reason: HOSPADM

## 2024-05-06 RX ORDER — NALOXONE HYDROCHLORIDE 0.4 MG/ML
0.2 INJECTION, SOLUTION INTRAMUSCULAR; INTRAVENOUS; SUBCUTANEOUS
Status: DISCONTINUED | OUTPATIENT
Start: 2024-05-06 | End: 2024-05-07 | Stop reason: HOSPADM

## 2024-05-06 RX ORDER — FENTANYL CITRATE 50 UG/ML
50-100 INJECTION, SOLUTION INTRAMUSCULAR; INTRAVENOUS EVERY 5 MIN PRN
Status: DISCONTINUED | OUTPATIENT
Start: 2024-05-06 | End: 2024-05-07 | Stop reason: HOSPADM

## 2024-05-06 RX ORDER — FLUMAZENIL 0.1 MG/ML
0.2 INJECTION, SOLUTION INTRAVENOUS
Status: DISCONTINUED | OUTPATIENT
Start: 2024-05-06 | End: 2024-05-07 | Stop reason: HOSPADM

## 2024-05-06 NOTE — H&P
[facility] Anesthesia Pre-op History and Physical    Elena Malik MRN# [id number]   Age: 58 year old YOB: 1965            Date of Exam 5/6/2024         Primary care provider: Merlyn Shelby         Chief Complaint and/or Reason for Procedure:     CRC screening - first colonoscopy.    No oromo interpretor available at the time - patient's daughter was able to translate with patient's consent         Active problem list:     Patient Active Problem List    Diagnosis Date Noted    Diabetes mellitus, type II (H) 03/15/2020     Priority: Medium    Radial styloid tenosynovitis 03/02/2016     Priority: Medium    Dermatosis papulosa nigra 11/10/2014     Priority: Medium    Imbalance 03/15/2012     Priority: Medium    Vertigo 03/15/2012     Priority: Medium    Prediabetes 02/17/2012     Priority: Medium    Constipation 08/02/2011     Priority: Medium    Urinary frequency 08/02/2011     Priority: Medium            Medications (include herbals and vitamins):   Any Plavix use in the last 7 days? No     Current Outpatient Medications   Medication Sig Dispense Refill    atorvastatin (LIPITOR) 20 MG tablet Take 1 tablet (20 mg) by mouth daily 90 tablet 3    metFORMIN (GLUCOPHAGE) 1000 MG tablet Take 1,000 mg by mouth 2 times daily (with meals)      bisacodyl (DULCOLAX) 5 MG EC tablet Take 2 tablets at 3 pm the day before your procedure. If your procedure is before 11 am, take 2 additional tablets at 11 pm. If your procedure is after 11 am, take 2 additional tablets at 6 am. For additional instructions refer to your colonoscopy prep instructions. 4 tablet 0    bisacodyl (DULCOLAX) 5 MG EC tablet Take 2 tablets at 3 pm the day before your procedure. If your procedure is before 11 am, take 2 additional tablets at 11 pm. If your procedure is after 11 am, take 2 additional tablets at 6 am. For additional instructions refer to your colonoscopy prep instructions. 4 tablet 0    blood glucose monitoring (NO  BRAND SPECIFIED) meter device kit Use to test blood sugar once times daily or as directed. 1 kit 11    calcium citrate-vitamin D (CITRACAL) 200-6.25 MG-MCG TABS per tablet Take 1 tablet by mouth 2 times daily      meclizine (ANTIVERT) 12.5 MG tablet Take 2 tablets (25 mg) by mouth 3 times daily as needed for dizziness 30 tablet 0    ondansetron (ZOFRAN) 4 MG tablet Take 1 tablet (4 mg) by mouth every 6 hours as needed for nausea or vomiting (Patient not taking: Reported on 9/19/2023) 18 tablet 0    polyethylene glycol (GOLYTELY) 236 g suspension The night before the exam at 6 pm drink an 8-ounce glass every 15 minutes until the jug is half empty. If you arrive before 11 AM: Drink the other half of the Golytely jug at 11 PM night before procedure. If you arrive after 11 AM: Drink the other half of the Golytely jug at 6 AM day of procedure. For additional instructions refer to your colonoscopy prep instructions. 4000 mL 0    polyethylene glycol (GOLYTELY) 236 g suspension The night before the exam at 6 pm drink an 8-ounce glass every 15 minutes until the jug is half empty. If you arrive before 11 AM: Drink the other half of the Golytely jug at 11 PM night before procedure. If you arrive after 11 AM: Drink the other half of the Golytely jug at 6 AM day of procedure. For additional instructions refer to your colonoscopy prep instructions. 4000 mL 0    scopolamine (TRANSDERM) 1 MG/3DAYS 72 hr patch Place 1 patch onto the skin every 72 hours (Patient not taking: Reported on 9/19/2023) 3 patch 0     Current Facility-Administered Medications   Medication Dose Route Frequency Provider Last Rate Last Admin    atropine injection 1 mg  1 mg Intravenous Once PRN Clark Quinones MD        benzocaine 20% (HURRICAINE/TOPEX) 20 % spray 0.5 mL  1 spray Mouth/Throat Once PRN Clark Quinones MD        diphenhydrAMINE (BENADRYL) injection 25-50 mg  25-50 mg Intravenous Once PRN Clark Quinones MD        EPINEPHrine  (Anaphylaxis) (ADRENALIN) injection (vial) 0.1 mg  0.1 mg Submucosal Once PRN Clark Quinones MD        fentaNYL (PF) (SUBLIMAZE) injection  mcg   mcg Intravenous Q5 Min PRN Clark Quinones MD        flumazenil (ROMAZICON) injection 0.2 mg  0.2 mg Intravenous q1 min prn Clark Quinones MD        glucagon injection 0.5 mg  0.5 mg Intravenous Once PRN Clark Quinones MD        lidocaine (LMX4) kit   Topical Q1H PRN Clark Quinones MD        lidocaine 1 % 0.1-1 mL  0.1-1 mL Other Q1H PRN Clark Quinones MD        midazolam (VERSED) injection 0.5-2 mg  0.5-2 mg Intravenous Q4 Min PRN Clark Quinones MD        naloxone (NARCAN) injection 0.2 mg  0.2 mg Intravenous Q2 Min PRN Clark Quinones MD        Or    naloxone (NARCAN) injection 0.4 mg  0.4 mg Intravenous Q2 Min PRN Clark Quinones MD        Or    naloxone (NARCAN) injection 0.2 mg  0.2 mg Intramuscular Q2 Min PRN Clark Quinones MD        Or    naloxone (NARCAN) injection 0.4 mg  0.4 mg Intramuscular Q2 Min PRN Clark Quinones MD        simethicone (MYLICON) suspension 133 mg  133 mg Oral Once PRN Clark Quinones MD        sodium chloride (PF) 0.9% PF flush 3 mL  3 mL Intracatheter Q8H Clark Quinones MD        sodium chloride (PF) 0.9% PF flush 3 mL  3 mL Intracatheter q1 min prn Clark Quinones MD        sodium chloride (PF) 0.9% PF flush 3 mL  3 mL Intravenous q1 min prn Clark Quinones MD        sodium chloride 0.9% BOLUS 500 mL  500 mL Intravenous Once PRN Clark Quinones MD                 Allergies:    No Known Allergies  Allergy to Latex? No  Allergy to tape?   No  Intolerances:             Physical Exam:   All vitals have been reviewed  Patient Vitals for the past 8 hrs:   BP Temp Temp src Pulse Resp SpO2   05/06/24 1032 119/63 98.5  F (36.9  C) Temporal 55 16 99 %     No intake/output data recorded.  Lungs:   No increased work of breathing, good air  exchange, clear to auscultation bilaterally, no crackles or wheezing     Cardiovascular:   normal S1 and S2             Lab / Radiology Results:            Anesthetic risk and/or ASA classification:   2    Nicole Larson DO

## 2024-10-05 ENCOUNTER — HEALTH MAINTENANCE LETTER (OUTPATIENT)
Age: 59
End: 2024-10-05

## 2024-11-27 NOTE — PROGRESS NOTES
Assessment & Plan     Type 2 diabetes mellitus without complication, without long-term current use of insulin (H)    Morning blood sugars have been pretty good at home, but she is getting some readings about 200 later in the day.  POC A1C was checked in clinic today and was 7.3, slightly above target of <7.  We'll continue metformin, and she'd like to add additional medication to get A1C below 7.  Suggested GLP1s but she prefers to avoid injections, so we'll try Rybelsus.   Foot exam normal today, she will schedule an eye exam.  Recheck A1C in 3 months.     - atorvastatin (LIPITOR) 20 MG tablet; Take 1 tablet (20 mg) by mouth daily.  - metFORMIN (GLUCOPHAGE) 1000 MG tablet; Take 1 tablet (1,000 mg) by mouth 2 times daily (with meals).  - FOOT EXAM    Dyslipidemia    Lipid panel UTD, continue current statin    - atorvastatin (LIPITOR) 20 MG tablet; Take 1 tablet (20 mg) by mouth daily.    Gastroesophageal reflux disease, unspecified whether esophagitis present    She is having frequent reflux, no red flag symptoms.  We'll start famotidine BID for a couple of months to see if that improves symptoms, can use Tums prn for breakthrough symptoms.  If not improved, can change to PPI.  If symptoms do improve, can try reducing famotidine to prn.     - famotidine (PEPCID) 20 MG tablet; Take 1 tablet (20 mg) by mouth 2 times daily.    Encounter for vitamin deficiency screening    Elena wondered if she may be low on vitamin D.  This was checked last year and was normal, so she was reassured and did not want to recheck today.    Declined HIV and Hep C screening- low risk.    Advised her to schedule a mammogram.      She says that pneumonia and tetanus vaccines were done at work, possibly Hep B was done as well- advised her to look for records to send.  Flu vax UTD.  She declined COVID vax today.         Subjective   Elena is a 58 year old, presenting for the following health issues:  Follow Up (Diabetic and medications,  "getting heartburn after eating, cholesterol checked)    History of Present Illness       Reason for visit:  Diabetes, cholesterol   She is taking medications regularly.       Diabetes Follow-up    How often are you checking your blood sugar? A few times a week-   What time of day are you checking your blood sugars (select all that apply)?  Before meals 137-150s.  Later in the day fasting over 200  Have you had any blood sugars above 200?  Yes  Have you had any blood sugars below 70?  No.  Can get to the 80s with symptoms of mild shakiness  What concerns do you have today about your diabetes? Blood sugar is often over 200   Do you have any of these symptoms? (Select all that apply)  Weight loss.  Occasional burning in the arch of the feet every couple of months  Have you had a diabetic eye exam in the last 12 months? No        BP Readings from Last 2 Encounters:   12/04/24 111/69   05/06/24 98/61     Hemoglobin A1C (%)   Date Value   04/30/2024 7.0 (H)   09/20/2023 7.5 (H)     LDL Cholesterol Calculated (mg/dL)   Date Value   04/30/2024 49   09/20/2023 109 (H)           GERD/Heartburn  Onset: less than a year  Description:   Burning in chest: YES- sternal area  Intensity: variable  Progression of Symptoms: waxing and waning  Accompanying Signs & Symptoms:  Does it feel like food gets stuck: no  Nausea: no  Vomiting (bloody?): no  Abdominal Pain: no  Black-Tarry stools: no:  Bloody stools: no  History:   Previous ulcers: no  Precipitating factors:   Caffeine use: YES- a cup per day  Alcohol use: no  NSAID/Aspirin use: no  Tobacco use: no  Worse with spicy foods.  Alleviating factors:  Not sure    Therapies Tried and outcome: hasn't tried anything yet       Constitutional, GI and endo systems are negative, except as otherwise noted.       Objective    /69 (BP Location: Right arm, Patient Position: Sitting, Cuff Size: Adult Regular)   Pulse 55   Temp 97.6  F (36.4  C) (Oral)   Resp 16   Ht 1.549 m (5' 1\")   " Wt 65.9 kg (145 lb 4.8 oz)   SpO2 100%   BMI 27.45 kg/m    Body mass index is 27.45 kg/m .  Physical Exam   GENERAL: alert and no distress  Diabetic foot exam: normal DP and PT pulses, no trophic changes or ulcerative lesions, and normal monofilament exam          Signed Electronically by: Kyrie Candelario MD

## 2024-12-04 ENCOUNTER — OFFICE VISIT (OUTPATIENT)
Dept: INTERNAL MEDICINE | Facility: CLINIC | Age: 59
End: 2024-12-04
Payer: COMMERCIAL

## 2024-12-04 VITALS
TEMPERATURE: 97.6 F | OXYGEN SATURATION: 100 % | HEART RATE: 55 BPM | HEIGHT: 61 IN | RESPIRATION RATE: 16 BRPM | BODY MASS INDEX: 27.43 KG/M2 | DIASTOLIC BLOOD PRESSURE: 69 MMHG | SYSTOLIC BLOOD PRESSURE: 111 MMHG | WEIGHT: 145.3 LBS

## 2024-12-04 DIAGNOSIS — E78.5 DYSLIPIDEMIA: ICD-10-CM

## 2024-12-04 DIAGNOSIS — K21.9 GASTROESOPHAGEAL REFLUX DISEASE, UNSPECIFIED WHETHER ESOPHAGITIS PRESENT: ICD-10-CM

## 2024-12-04 DIAGNOSIS — E11.9 TYPE 2 DIABETES MELLITUS WITHOUT COMPLICATION, WITHOUT LONG-TERM CURRENT USE OF INSULIN (H): Primary | ICD-10-CM

## 2024-12-04 DIAGNOSIS — Z13.21 ENCOUNTER FOR VITAMIN DEFICIENCY SCREENING: ICD-10-CM

## 2024-12-04 LAB
EST. AVERAGE GLUCOSE BLD GHB EST-MCNC: 163 MG/DL
HBA1C MFR BLD: 7.3 %

## 2024-12-04 RX ORDER — LANCETS 30 GAUGE
EACH MISCELLANEOUS
COMMUNITY
Start: 2024-11-07

## 2024-12-04 RX ORDER — ATORVASTATIN CALCIUM 20 MG/1
20 TABLET, FILM COATED ORAL DAILY
Qty: 90 TABLET | Refills: 3 | Status: SHIPPED | OUTPATIENT
Start: 2024-12-04

## 2024-12-04 RX ORDER — FAMOTIDINE 20 MG/1
20 TABLET, FILM COATED ORAL 2 TIMES DAILY
Qty: 180 TABLET | Refills: 3 | Status: SHIPPED | OUTPATIENT
Start: 2024-12-04

## 2024-12-04 RX ORDER — BLOOD SUGAR DIAGNOSTIC
STRIP MISCELLANEOUS
COMMUNITY
Start: 2024-11-07

## 2024-12-04 NOTE — PROGRESS NOTES
"  {PROVIDER CHARTING PREFERENCE:114997}    Steven Parker is a 58 year old, presenting for the following health issues:  Follow Up (Diabetic and medications, getting heartburn after eating, cholesterol checked)      12/4/2024    10:47 AM   Additional Questions   Roomed by [facility] EMT   Accompanied by Son Dione     History of Present Illness       Reason for visit:  Diabetes, cholesterol   She is taking medications regularly.       {MA/LPN/RN Pre-Provider Visit Orders- hCG/UA/Strep (Optional):524693}  {SUPERLIST (Optional):760292}  {additonal problems for provider to add (Optional):870890}    {ROS Picklists (Optional):694703}      Objective    /69 (BP Location: Right arm, Patient Position: Sitting, Cuff Size: Adult Regular)   Pulse 55   Temp 97.6  F (36.4  C) (Oral)   Resp 16   Ht 1.549 m (5' 1\")   Wt 65.9 kg (145 lb 4.8 oz)   SpO2 100%   BMI 27.45 kg/m    Body mass index is 27.45 kg/m .  Physical Exam   {Exam List (Optional):891750}    {Diagnostic Test Results (Optional):348203}        Signed Electronically by: Kyrie Candelario MD  {Email feedback regarding this note to [email or web address]   :[id number]}  "

## 2024-12-04 NOTE — PATIENT INSTRUCTIONS
Let us know if the acid reflux symptoms are not improved after a month or two on the famotidine.  You can try Tums as needed for break through symptoms.  If symptoms improve, you can reduce the famotidine to as needed.      Schedule an eye exam.     See if you can find records for pneumonia, tetanus, and hepatitis B vaccines to put in your chart.     Mammogram was ordered.  Please call [phone] to schedule or set it up on [facility].

## 2025-01-19 ENCOUNTER — HEALTH MAINTENANCE LETTER (OUTPATIENT)
Age: 60
End: 2025-01-19

## 2025-02-18 DIAGNOSIS — E11.9 TYPE 2 DIABETES MELLITUS WITHOUT COMPLICATION, WITHOUT LONG-TERM CURRENT USE OF INSULIN (H): ICD-10-CM

## 2025-02-26 RX ORDER — ORAL SEMAGLUTIDE 7 MG/1
TABLET ORAL
Qty: 30 TABLET | Refills: 0 | OUTPATIENT
Start: 2025-02-26

## 2025-02-26 NOTE — TELEPHONE ENCOUNTER
Note from 1/24 states she stopped Rybelsus due to diarrhea and bloating, so I assume this was a pharmacy generated refill request.  Refill declined.    Thanks,  Kyrie Candelario MD

## 2025-02-26 NOTE — TELEPHONE ENCOUNTER
"Per TE dated 1/24/2025, the [facility] [place] called and spoke with the patient on 1/31/2025 at which time it was noted that, \"The patient also clarified that she does not want a refill of Rybelsus.\"   "

## 2025-03-16 ENCOUNTER — HEALTH MAINTENANCE LETTER (OUTPATIENT)
Age: 60
End: 2025-03-16

## 2025-03-31 DIAGNOSIS — E11.9 TYPE 2 DIABETES MELLITUS WITHOUT COMPLICATION, WITHOUT LONG-TERM CURRENT USE OF INSULIN (H): ICD-10-CM

## 2025-04-02 ENCOUNTER — LAB (OUTPATIENT)
Dept: LAB | Facility: CLINIC | Age: 60
End: 2025-04-02
Payer: COMMERCIAL

## 2025-04-02 DIAGNOSIS — E11.9 DIABETES MELLITUS, TYPE II (H): Primary | ICD-10-CM

## 2025-04-02 DIAGNOSIS — E78.5 DYSLIPIDEMIA: ICD-10-CM

## 2025-04-02 DIAGNOSIS — E11.9 TYPE 2 DIABETES MELLITUS WITHOUT COMPLICATION, WITHOUT LONG-TERM CURRENT USE OF INSULIN (H): ICD-10-CM

## 2025-04-02 LAB
ALBUMIN SERPL BCG-MCNC: 4.3 G/DL (ref 3.5–5.2)
ALP SERPL-CCNC: 130 U/L (ref 40–150)
ALT SERPL W P-5'-P-CCNC: 36 U/L (ref 0–50)
ANION GAP SERPL CALCULATED.3IONS-SCNC: 9 MMOL/L (ref 7–15)
AST SERPL W P-5'-P-CCNC: 27 U/L (ref 0–45)
BASOPHILS # BLD AUTO: 0.1 10E3/UL (ref 0–0.2)
BASOPHILS NFR BLD AUTO: 1 %
BILIRUB SERPL-MCNC: <0.2 MG/DL
BUN SERPL-MCNC: 13 MG/DL (ref 6–20)
CALCIUM SERPL-MCNC: 9.5 MG/DL (ref 8.8–10.4)
CHLORIDE SERPL-SCNC: 104 MMOL/L (ref 98–107)
CHOLEST SERPL-MCNC: 153 MG/DL
CREAT SERPL-MCNC: 0.56 MG/DL (ref 0.51–0.95)
CREAT UR-MCNC: 15.5 MG/DL
EGFRCR SERPLBLD CKD-EPI 2021: >90 ML/MIN/1.73M2
EOSINOPHIL # BLD AUTO: 0.1 10E3/UL (ref 0–0.7)
EOSINOPHIL NFR BLD AUTO: 2 %
ERYTHROCYTE [DISTWIDTH] IN BLOOD BY AUTOMATED COUNT: 13.2 % (ref 10–15)
EST. AVERAGE GLUCOSE BLD GHB EST-MCNC: 157 MG/DL
FASTING STATUS PATIENT QL REPORTED: NO
FASTING STATUS PATIENT QL REPORTED: NO
GLUCOSE SERPL-MCNC: 138 MG/DL (ref 70–99)
HBA1C MFR BLD: 7.1 %
HCO3 SERPL-SCNC: 25 MMOL/L (ref 22–29)
HCT VFR BLD AUTO: 38 % (ref 35–47)
HDLC SERPL-MCNC: 57 MG/DL
HGB BLD-MCNC: 12.6 G/DL (ref 11.7–15.7)
IMM GRANULOCYTES # BLD: 0 10E3/UL
IMM GRANULOCYTES NFR BLD: 0 %
LDLC SERPL CALC-MCNC: 75 MG/DL
LYMPHOCYTES # BLD AUTO: 2.2 10E3/UL (ref 0.8–5.3)
LYMPHOCYTES NFR BLD AUTO: 34 %
MCH RBC QN AUTO: 28.4 PG (ref 26.5–33)
MCHC RBC AUTO-ENTMCNC: 33.2 G/DL (ref 31.5–36.5)
MCV RBC AUTO: 86 FL (ref 78–100)
MICROALBUMIN UR-MCNC: <12 MG/L
MICROALBUMIN/CREAT UR: NORMAL MG/G{CREAT}
MONOCYTES # BLD AUTO: 0.4 10E3/UL (ref 0–1.3)
MONOCYTES NFR BLD AUTO: 7 %
NEUTROPHILS # BLD AUTO: 3.6 10E3/UL (ref 1.6–8.3)
NEUTROPHILS NFR BLD AUTO: 56 %
NONHDLC SERPL-MCNC: 96 MG/DL
NRBC # BLD AUTO: 0 10E3/UL
NRBC BLD AUTO-RTO: 0 /100
PLATELET # BLD AUTO: 268 10E3/UL (ref 150–450)
POTASSIUM SERPL-SCNC: 4.5 MMOL/L (ref 3.4–5.3)
PROT SERPL-MCNC: 7.3 G/DL (ref 6.4–8.3)
RBC # BLD AUTO: 4.43 10E6/UL (ref 3.8–5.2)
SODIUM SERPL-SCNC: 138 MMOL/L (ref 135–145)
TRIGL SERPL-MCNC: 107 MG/DL
VIT D+METAB SERPL-MCNC: 23 NG/ML (ref 20–50)
WBC # BLD AUTO: 6.5 10E3/UL (ref 4–11)

## 2025-04-02 PROCEDURE — 80053 COMPREHEN METABOLIC PANEL: CPT | Performed by: PATHOLOGY

## 2025-04-02 PROCEDURE — 80061 LIPID PANEL: CPT | Performed by: PATHOLOGY

## 2025-04-02 PROCEDURE — 99000 SPECIMEN HANDLING OFFICE-LAB: CPT | Performed by: PATHOLOGY

## 2025-04-02 PROCEDURE — 82570 ASSAY OF URINE CREATININE: CPT | Performed by: INTERNAL MEDICINE

## 2025-04-02 PROCEDURE — 83036 HEMOGLOBIN GLYCOSYLATED A1C: CPT | Performed by: INTERNAL MEDICINE

## 2025-04-02 PROCEDURE — 82306 VITAMIN D 25 HYDROXY: CPT | Performed by: INTERNAL MEDICINE

## 2025-04-02 PROCEDURE — 82043 UR ALBUMIN QUANTITATIVE: CPT | Performed by: INTERNAL MEDICINE

## 2025-04-02 PROCEDURE — 85025 COMPLETE CBC W/AUTO DIFF WBC: CPT | Performed by: PATHOLOGY

## 2025-04-02 PROCEDURE — 36415 COLL VENOUS BLD VENIPUNCTURE: CPT | Performed by: PATHOLOGY

## 2025-04-07 RX ORDER — ORAL SEMAGLUTIDE 7 MG/1
7 TABLET ORAL DAILY
Qty: 30 TABLET | Refills: 0 | OUTPATIENT
Start: 2025-04-07

## 2025-07-20 ENCOUNTER — HEALTH MAINTENANCE LETTER (OUTPATIENT)
Age: 60
End: 2025-07-20

## (undated) DEVICE — KIT ENDO FIRST STEP DISINFECTANT 200ML W/POUCH EP-4

## (undated) DEVICE — PAD CHUX UNDERPAD 23X24" 7136

## (undated) DEVICE — PREP CHLORAPREP 26ML TINTED ORANGE  260815

## (undated) RX ORDER — FENTANYL CITRATE 50 UG/ML
INJECTION, SOLUTION INTRAMUSCULAR; INTRAVENOUS
Status: DISPENSED
Start: 2024-05-06